# Patient Record
Sex: MALE | Race: WHITE | NOT HISPANIC OR LATINO | Employment: OTHER | URBAN - METROPOLITAN AREA
[De-identification: names, ages, dates, MRNs, and addresses within clinical notes are randomized per-mention and may not be internally consistent; named-entity substitution may affect disease eponyms.]

---

## 2017-02-03 ENCOUNTER — LAB VISIT (OUTPATIENT)
Dept: LAB | Facility: HOSPITAL | Age: 52
End: 2017-02-03
Attending: INTERNAL MEDICINE
Payer: COMMERCIAL

## 2017-02-03 DIAGNOSIS — E78.5 HYPERLIPIDEMIA, UNSPECIFIED HYPERLIPIDEMIA TYPE: ICD-10-CM

## 2017-02-03 LAB
CHOLEST/HDLC SERPL: 4.8 {RATIO}
HDL/CHOLESTEROL RATIO: 20.7 %
HDLC SERPL-MCNC: 193 MG/DL
HDLC SERPL-MCNC: 40 MG/DL
LDLC SERPL CALC-MCNC: 123.2 MG/DL
NONHDLC SERPL-MCNC: 153 MG/DL
TRIGL SERPL-MCNC: 149 MG/DL

## 2017-02-03 PROCEDURE — 80061 LIPID PANEL: CPT

## 2017-02-03 PROCEDURE — 36415 COLL VENOUS BLD VENIPUNCTURE: CPT | Mod: PO

## 2017-02-09 ENCOUNTER — OFFICE VISIT (OUTPATIENT)
Dept: INTERNAL MEDICINE | Facility: CLINIC | Age: 52
End: 2017-02-09
Payer: COMMERCIAL

## 2017-02-09 VITALS
HEIGHT: 72 IN | BODY MASS INDEX: 25.89 KG/M2 | SYSTOLIC BLOOD PRESSURE: 124 MMHG | HEART RATE: 86 BPM | OXYGEN SATURATION: 97 % | DIASTOLIC BLOOD PRESSURE: 80 MMHG | TEMPERATURE: 98 F | WEIGHT: 191.13 LBS

## 2017-02-09 DIAGNOSIS — E78.5 HYPERLIPIDEMIA, UNSPECIFIED HYPERLIPIDEMIA TYPE: Primary | ICD-10-CM

## 2017-02-09 PROCEDURE — 99999 PR PBB SHADOW E&M-EST. PATIENT-LVL III: CPT | Mod: PBBFAC,,, | Performed by: INTERNAL MEDICINE

## 2017-02-09 PROCEDURE — 99214 OFFICE O/P EST MOD 30 MIN: CPT | Mod: S$GLB,,, | Performed by: INTERNAL MEDICINE

## 2017-02-09 NOTE — PATIENT INSTRUCTIONS
Recommendations for today  · Suspend mirtazapine therapy and see how you do.  · It is possible for the first 2 or 3 nights to have a little more difficulty with sleep.  · If problems with sleep persist beyond 3 days and they are disruptive you can resume the medication.  Please communicate the outcome of this medication holiday.    Watching blood pressure    Avoid common mistakes when checking blood pressure  Taking blood pressure over the top of clothing.  Checking blood pressure repeatedly on the same arm (better to check on the other arm)  Using blood pressure cuff that is too small (inflatable portion should cover at least 75% of your upper arm)    GOAL BLOOD PRESSURE:     Top number less than 140  Bottom number less than 90    Please contact the clinic if you have several numbers above your target

## 2017-02-09 NOTE — MR AVS SNAPSHOT
[facility]   [place]  [place] [address]  Phone: [phone]  Fax: [phone]                  Simeon Bishop   2017 2:00 PM   Office Visit    Description:  Male : 1965   Provider:  Carlo Menchaca MD   Department:  [facility]           Reason for Visit     Follow-up           Diagnoses this Visit        Comments    Hyperlipidemia, unspecified hyperlipidemia type    -  Primary            To Do List           Future Appointments        Provider Department Dept Phone    8/3/2017 7:45 AM LAB, [facility] [phone]    2017 10:20 AM Carlo Menchaca MD [facility] [phone]      Goals (5 Years of Data)     None      [facility]     [facility] Care [facility] -  Assistance  Registered nurses in the [facility] provide clinical advisement, health education, appointment booking, and other advisory services.  Call for this free service at [phone].             Medications           Message regarding Medications     Verify the changes and/or additions to your medication regime listed below are the same as discussed with your clinician today.  If any of these changes or additions are incorrect, please notify your healthcare provider.        STOP taking these medications     mirtazapine (REMERON) 15 MG tablet Take 1 tablet (15 mg total) by mouth every evening.           Verify that the below list of medications is an accurate representation of the medications you are currently taking.  If none reported, the list may be blank. If incorrect, please contact your healthcare provider. Carry this list with you in case of emergency.           Current Medications            Clinical Reference Information           Your Vitals Were     BP Pulse Temp Height Weight SpO2    124/80 (BP Location: Right arm, Patient Position: Sitting, BP Method: Manual) 86 97.7 °F (36.5 °C)  (Oral) 6' (1.829 m) 86.7 kg (191 lb 2.2 oz) 97%    BMI                25.92 kg/m2          Blood Pressure          Most Recent Value    BP  124/80      Allergies as of 2/9/2017     No Known Allergies      Immunizations Administered on Date of Encounter - 2/9/2017     None      Orders Placed During Today's Visit     Future Labs/Procedures Expected by Expires    Lipid panel  2/9/2017 9/10/2017      Instructions    Recommendations for today  · Suspend mirtazapine therapy and see how you do.  · It is possible for the first 2 or 3 nights to have a little more difficulty with sleep.  · If problems with sleep persist beyond 3 days and they are disruptive you can resume the medication.  Please communicate the outcome of this medication holiday.    Watching blood pressure    Avoid common mistakes when checking blood pressure  Taking blood pressure over the top of clothing.  Checking blood pressure repeatedly on the same arm (better to check on the other arm)  Using blood pressure cuff that is too small (inflatable portion should cover at least 75% of your upper arm)    GOAL BLOOD PRESSURE:     Top number less than 140  Bottom number less than 90    Please contact the clinic if you have several numbers above your target             Language Assistance Services     ATTENTION: Language assistance services are available, free of charge. Please call [phone].      ATENCIÓN: Si habla ryan, tiene a price disposición servicios gratuitos de asistencia lingüística. Llame al [phone].     CHÚ Ý: N?u b?n nói Ti?ng Vi?t, có các d?ch v? h? tr? ngôn ng? mi?n phí dành cho b?n. G?i s? [phone].         [facility] complies with applicable Federal civil rights laws and does not discriminate on the basis of race, color, national origin, age, disability, or sex.

## 2017-02-09 NOTE — PROGRESS NOTES
Portions of this note are generated with voice recognition software. Typographical errors may exist.     SUBJECTIVE:    This is a/an 51 y.o. male here for primary care visit for  Chief Complaint   Patient presents with    Follow-up     hyperlipidemia     Patient states that he has been very physically active since his last evaluation in clinic.  At least 5 days per week he is going to [employer] for approximately 30-40 minutes before going to work.  [place]  Patient denies any specific orthopedic injuries that have occurred as a result of this new routine.  States that overall his stress levels have reduced significantly.    Insomnia.  Since starting mirtazapine the patient states that his sleeping is more restful.  States that he typically gets home from work at 5:30 PM.  Initiate sleep around 10:30 PM.  Sleep latency is usually not prolonged.  He will go to sleep at 11 PM typically and awaken at 7 AM without many interruptions.  States that he feels rested in the morning.  No excessive grogginess.    Anxiety.  Patient states that he does not have any disruptive symptoms at work.  Interpersonal relationships have improved.  Patient has complied with mirtazapine since starting medication.  On the other hand the patient states he doesn't want to stay on the medication long-term if possible.  He denies family history of hyperthyroidism.    Hyperlipidemia.  Patient states that he did make some changes in food choices.  Reducing the amount of red meat that he eats.  Reducing consumption of alcoholic beverages.    Medications Reviewed and Updated    Past medical, family, and social histories were reviewed and updated.    Review of Systems negative unless noted otherwise in history of present illness-  General ROS: negative  Psychological ROS: negative  Endocrine ROS: negative  Musculoskeletal ROS: negative  Neurological ROS: negative    Allergic:  Review of patient's allergies  indicates:  No Known Allergies    OBJECTIVE:  BP: 124/80 Pulse: 86 Temp: 97.7 °F (36.5 °C)  Wt Readings from Last 3 Encounters:   02/09/17 86.7 kg (191 lb 2.2 oz)   11/15/16 87.5 kg (193 lb)   11/09/16 88.5 kg (195 lb 1.7 oz)    Body mass index is 25.92 kg/(m^2).  Previous Blood Pressure Readings :   BP Readings from Last 3 Encounters:   02/09/17 124/80   11/15/16 119/76   11/09/16 (!) 140/92       GEN: No apparent distress  HEENT: sclera non-icteric, conjunctiva clear  CV: no peripheral edema regular rate and rhythm no significant murmurs.  PULM: breathing non-labored  ABD: Obese, protuberant abdomen.  PSYCH: appropriate affect  MSK: able to rise from chair without assistance  SKIN: normal skin turgor    Pertinent Labs Reviewed       ASSESSMENT/PLAN:    Overweight.  Clinical condition improving.  Recommend patient continue with lifestyle measures.    Insomnia.  Clinical follow-up warranted.  Etiology multifactorial.  Sleep hygiene problems.  Anxiety disorder unspecified.  Recommend patient pursue medication holiday as below.    Anxiety disorder unspecified.  Improving overall.  No strong indication for patient to remain on mirtazapine therapy at this time.  Recommend medication holiday to see how he does.    Hyperlipidemia.  Improved overall with lifestyle measures.  No clear indication for statin therapy at this time.      Future Appointments  Date Time Provider Department Center   8/3/2017 7:45 AM LAB, [facility] [place]   8/9/2017 10:20 AM Carlo Menchaca MD [facility] [place]       Carlo Menchaca  2/9/2017  3:18 PM

## 2017-10-23 DIAGNOSIS — F51.05 INSOMNIA SECONDARY TO ANXIETY: ICD-10-CM

## 2017-10-23 DIAGNOSIS — F41.9 INSOMNIA SECONDARY TO ANXIETY: ICD-10-CM

## 2017-10-24 RX ORDER — MIRTAZAPINE 15 MG/1
TABLET, FILM COATED ORAL
Qty: 90 TABLET | Refills: 1 | Status: SHIPPED | OUTPATIENT
Start: 2017-10-24 | End: 2021-01-13 | Stop reason: SDUPTHER

## 2020-03-28 ENCOUNTER — OFFICE VISIT (OUTPATIENT)
Dept: URGENT CARE | Facility: CLINIC | Age: 55
End: 2020-03-28
Payer: COMMERCIAL

## 2020-03-28 VITALS
HEIGHT: 72 IN | WEIGHT: 191.13 LBS | RESPIRATION RATE: 18 BRPM | TEMPERATURE: 99 F | DIASTOLIC BLOOD PRESSURE: 89 MMHG | BODY MASS INDEX: 25.89 KG/M2 | HEART RATE: 87 BPM | OXYGEN SATURATION: 96 % | SYSTOLIC BLOOD PRESSURE: 128 MMHG

## 2020-03-28 DIAGNOSIS — M79.605 LEFT LEG PAIN: ICD-10-CM

## 2020-03-28 DIAGNOSIS — R07.89 CHEST WALL PAIN: Primary | ICD-10-CM

## 2020-03-28 PROCEDURE — 93010 EKG 12-LEAD: ICD-10-PCS | Mod: S$GLB,,, | Performed by: INTERNAL MEDICINE

## 2020-03-28 PROCEDURE — 99203 OFFICE O/P NEW LOW 30 MIN: CPT | Mod: S$GLB,,, | Performed by: FAMILY MEDICINE

## 2020-03-28 PROCEDURE — 99203 PR OFFICE/OUTPT VISIT, NEW, LEVL III, 30-44 MIN: ICD-10-PCS | Mod: S$GLB,,, | Performed by: FAMILY MEDICINE

## 2020-03-28 PROCEDURE — 93010 ELECTROCARDIOGRAM REPORT: CPT | Mod: S$GLB,,, | Performed by: INTERNAL MEDICINE

## 2020-03-28 PROCEDURE — 93005 ELECTROCARDIOGRAM TRACING: CPT | Mod: S$GLB,,, | Performed by: FAMILY MEDICINE

## 2020-03-28 PROCEDURE — 93005 EKG 12-LEAD: ICD-10-PCS | Mod: S$GLB,,, | Performed by: FAMILY MEDICINE

## 2020-03-28 RX ORDER — CYCLOBENZAPRINE HCL 5 MG
5 TABLET ORAL 3 TIMES DAILY PRN
Qty: 30 TABLET | Refills: 0 | Status: SHIPPED | OUTPATIENT
Start: 2020-03-28 | End: 2021-01-13 | Stop reason: SDUPTHER

## 2020-03-28 NOTE — PROGRESS NOTES
Subjective:       Patient ID: Simeon Bishop is a 54 y.o. male.    Vitals:  height is 6' (1.829 m) and weight is 86.7 kg (191 lb 2.2 oz). His tympanic temperature is 98.5 °F (36.9 °C). His blood pressure is 128/89 and his pulse is 87. His respiration is 18 and oxygen saturation is 96%.     Chief Complaint: Leg Pain (no injury )    Patient states 1 month intermittent left lateral thigh pain, he feels more constant in the last week feels like a throbbing will go away with stretching but then returns.  Also with 2 weeks of right chest wall pain that was intermittent now he feels more constant.  No shortness of breath nausea diaphoresis.  No chest tightness or pressure.  He can specifically localize the chest wall pain and the leg pain.  The chest wall pain feels like a dull sometimes sharp pain.  He is worried about a blood clot and a pulmonary embolism from what he read on the Internet.  States his anxiety has been heightened in the last few weeks with corona virus and his brother is in [facility].  Patient states he did not take any medicine.  No recent travel immobilization hospitalizations.  Never had leg pain or swelling.  No history of DVT or PE.    Leg Pain    The incident occurred more than 1 week ago. The incident occurred at home. There was no injury mechanism. The pain is present in the left leg. The quality of the pain is described as stabbing. The pain has been fluctuating since onset. Pertinent negatives include no inability to bear weight, loss of motion, loss of sensation, muscle weakness, numbness or tingling.       Constitution: Negative for chills, fatigue and fever.   HENT: Negative for congestion and sore throat.    Neck: Negative for painful lymph nodes.   Cardiovascular: Positive for chest pain (right chest wall). Negative for leg swelling, palpitations and sob on exertion.   Eyes: Negative for double vision and blurred vision.   Respiratory: Negative for cough and shortness of breath.     Gastrointestinal: Negative for nausea, vomiting and diarrhea.   Genitourinary: Negative for dysuria, frequency and urgency.   Musculoskeletal: Positive for pain (left leg). Negative for joint pain, joint swelling, muscle cramps and muscle ache.   Skin: Negative for color change, pale, rash and erythema.   Allergic/Immunologic: Negative for seasonal allergies.   Neurological: Negative for dizziness, history of vertigo, light-headedness, passing out, headaches and numbness.   Hematologic/Lymphatic: Negative for swollen lymph nodes, easy bruising/bleeding and history of blood clots. Does not bruise/bleed easily.   Psychiatric/Behavioral: Negative for nervous/anxious, sleep disturbance and depression. The patient is not nervous/anxious.        Objective:      Physical Exam   Constitutional: He is oriented to person, place, and time. He appears well-developed and well-nourished.   Pt is fidgeting a lot in clinic, appears a little anxious   HENT:   Head: Normocephalic and atraumatic. Head is without abrasion, without contusion and without laceration.   Right Ear: External ear normal.   Left Ear: External ear normal.   Nose: Nose normal.   Mouth/Throat: Oropharynx is clear and moist and mucous membranes are normal.   Eyes: Pupils are equal, round, and reactive to light. Conjunctivae, EOM and lids are normal.   Neck: Trachea normal, full passive range of motion without pain and phonation normal. Neck supple.   Cardiovascular: Normal rate, regular rhythm and normal heart sounds.   No murmur heard.      Pulmonary/Chest: Effort normal and breath sounds normal. No accessory muscle usage or stridor. No tachypnea. No respiratory distress. He has no decreased breath sounds. He has no wheezes. He has no rhonchi. He has no rales.   Musculoskeletal: Normal range of motion.        Legs:  Thighs and calves measured and symmetrical.  No erythema warmth swelling.  Neurovascularly intact distally.  Walking without difficulty    Neurological: He is alert and oriented to person, place, and time.   Skin: Skin is warm, dry, intact and no rash. Capillary refill takes less than 2 seconds. abrasion, burn, bruising, erythema and ecchymosis  Psychiatric: He has a normal mood and affect. His speech is normal and behavior is normal. Judgment and thought content normal. Cognition and memory are normal.   Nursing note and vitals reviewed.      EKG: normal EKG, normal sinus rhythm, unchanged from previous tracings. Rate of 75 bpm. No ectopy. T wave inversion III present on previous. Upward slant of ST segment V3-V6 present on previous ekg.     Assessment:       1. Chest wall pain    2. Left leg pain        Plan:         Chest wall pain  -     EKG 12-lead  -     cyclobenzaprine (FLEXERIL) 5 MG tablet; Take 1 tablet (5 mg total) by mouth 3 (three) times daily as needed for Muscle spasms (1-2 tablets prn).  Dispense: 30 tablet; Refill: 0    Left leg pain  -     cyclobenzaprine (FLEXERIL) 5 MG tablet; Take 1 tablet (5 mg total) by mouth 3 (three) times daily as needed for Muscle spasms (1-2 tablets prn).  Dispense: 30 tablet; Refill: 0     Based on patient's description of the pain I feel this is more likely musculoskeletal.  He has no asymmetry upon measuring the legs no redness warmth swelling, no recent immobilizations or hospitals and no personal history thrombosis.  Wells score 0. I discussed warning signs to go the ER patient agreeable    Patient Instructions   PLEASE READ YOUR DISCHARGE INSTRUCTIONS ENTIRELY AS IT CONTAINS IMPORTANT INFORMATION.      Please drink plenty of fluids.  Please get plenty of rest.  Ice to the area.   You may do gently stretching if tolerable.    Please return here or go to the Emergency Department for any concerns or worsening of condition.    If you were prescribed a narcotic medication or muscle relaxer (flexeril), do not drive or operate heavy equipment or machinery while taking these medications. Take a half first to  see how it affects you  Tried taking once daily to start    Tried taking once daily for the 1st few days  If you were not prescribed an anti-inflammatory medication, and if you do not have any history of stomach/intestinal ulcers, or kidney disease, or are not taking a blood thinner such as Coumadin, Plavix, Pradaxa, Eloquis, or Xaralta for example, it is OK to take over the counter Ibuprofen or Advil or Motrin or Aleve as directed.  Do not take these medications on an empty stomach.      Gentle stretching    Ice to the area    Please go to the emergency room if you experience different or changing chest pain, shortness of breath, funny heart beats, headache, blurred vision, weakness in one arm or leg, slurred speech, numbness, inability to walk or talk, confusion.       Please follow up with your primary care doctor or specialist as needed.    If you  smoke, please stop smoking.      Please arrange follow up with your primary medical clinic as soon as possible. You must understand that you've received an Urgent Care treatment only and that you may be released before all of your medical problems are known or treated. You, the patient, will arrange for follow up as instructed. If your symptoms worsen or fail to improve you should go to the Emergency Room.      Chest Strain    You have a chest strain. This happens when the muscles between the ribs stretch and tear. This may occur when you have a severe cough. It may also happen after strenuous lifting or twisting injuries of the upper back.  A chest strain usually causes pain when you move or take a deep breath. The strain may take a few days to a few weeks to heal.  Home care  Follow these guidelines when caring for yourself at home:  · Rest. Dont do any heavy lifting or strenuous activity. Dont do any activity that causes pain.  · If you have a severe cough, use a cough syrup with dextromethorphan, unless another cough medicine was prescribed. If you have high  blood pressure, check with your health care provider or pharmacist before using an over-the-counter cough medicine.  · You may use acetaminophen or ibuprofen to control pain, unless another medicine was prescribed. If you have chronic liver or kidney disease, talk with your provider before using these medicines. Also talk with your provider if youve had a stomach ulcer or GI bleeding.  Follow-up care  Follow up with your health care provider, or as advised.  When to seek medical advice  Call your health care provider right away if any of these occur:  · A change in the type of pain. This means if it feels different, gets worse, lasts longer, or begins to spread into your shoulder, arm, neck, jaw, or back.  · Pain doesnt go away in 1 week  · Shortness of breath, difficulty breathing, or fast breathing  · Pain gets worse when you breathe  · Cough with dark-colored sputum (phlegm) or blood  · Weakness, dizziness, or fainting  · Fever of 101ºF (38.3ºC) or higher, or as directed by your health care provider   Date Last Reviewed: 2/15/2015  © 3405-4572 [employer]. [address] [place] [address]. All rights reserved. This information is not intended as a substitute for professional medical care. Always follow your healthcare professional's instructions.        Leg Cramps  A muscle cramp or spasm is a strong contraction of the muscle fibers. It is also called a charley horse. This may occur in the foot, calf, or thigh at night when the legs are elevated. If the spasm is prolonged, it can become very painful.  This may be caused by sleeping in an uncomfortable position, muscle fatigue, poor muscle tone from lack of exercise and stretching, dehydration, electrolyte imbalance, diabetes, alcohol use, and certain medicine.  Home care  · Drink plenty of fluids during the day to prevent dehydration.  · Stretch your legs before bedtime.  · Eat a diet high in potassium. These foods include fresh fruit,  such as bananas, oranges, cantaloupe, and honeydew melon. It also includes apple, prune, orange, grape and pineapple juices. Other foods high in potassium are white, red, and moreno beans, baked potatoes, raw spinach, cod, flounder, halibut, salmon, and scallops.  · Talk with your healthcare provider about taking mineral and vitamin supplements that contain magnesium and vitamin B-12 if you are not already taking these. Other prescription medicines may also be used.  · Avoid stimulants such as caffeine, nicotine, decongestants.  How to relieve an acute leg cramp  · For mild pain, getting out of the bed and walking may help. Some people find relief with heat and massage. You can apply heat with a warm shower, bath, or compress. Some people feel better with a cold packs. You can make an ice pack by filling a plastic bag that seals at the top with ice cubes and then wrapping it with a thin towel. Try both and use the method that feels best for 15 to 20 minutes at a time.  · For severe pain, stretching the muscle that is in spasm may quickly relieve the pain.  · When the spasm is in your foot, your toes may curl up or down. To stretch the muscle in spasm, bend your toes in the opposite direction. If the spasm pulls your toes up, bend them down. If the spasm pulls them down, bend them up.  · When the spasm is in your calf, bend the ankle so the foot points upward toward your knee.  · When the spasm is in your thigh, bend or straighten the knee and hip until you feel relief.  Follow-up care  Follow up with your healthcare provider, or as advised.  When to seek medical advice  Call your healthcare provider right away if any of these occur:  · Walking makes your pain worse and rest makes it better  · You develop weakness in the affected leg  · Pain or frequency of spasms increases and is not controlled by the above measures  Date Last Reviewed: 11/23/2015  © 4971-6464 [employer]. [address] [place] [address],  JOHNNY Felix [address]. All rights reserved. This information is not intended as a substitute for professional medical care. Always follow your healthcare professional's instructions.

## 2020-03-28 NOTE — PATIENT INSTRUCTIONS
PLEASE READ YOUR DISCHARGE INSTRUCTIONS ENTIRELY AS IT CONTAINS IMPORTANT INFORMATION.      Please drink plenty of fluids.  Please get plenty of rest.  Ice to the area.   You may do gently stretching if tolerable.    Please return here or go to the Emergency Department for any concerns or worsening of condition.    If you were prescribed a narcotic medication or muscle relaxer (flexeril), do not drive or operate heavy equipment or machinery while taking these medications. Take a half first to see how it affects you  Tried taking once daily to start    Tried taking once daily for the 1st few days  If you were not prescribed an anti-inflammatory medication, and if you do not have any history of stomach/intestinal ulcers, or kidney disease, or are not taking a blood thinner such as Coumadin, Plavix, Pradaxa, Eloquis, or Xaralta for example, it is OK to take over the counter Ibuprofen or Advil or Motrin or Aleve as directed.  Do not take these medications on an empty stomach.      Gentle stretching    Ice to the area    Please go to the emergency room if you experience different or changing chest pain, shortness of breath, funny heart beats, headache, blurred vision, weakness in one arm or leg, slurred speech, numbness, inability to walk or talk, confusion.       Please follow up with your primary care doctor or specialist as needed.    If you  smoke, please stop smoking.      Please arrange follow up with your primary medical clinic as soon as possible. You must understand that you've received an Urgent Care treatment only and that you may be released before all of your medical problems are known or treated. You, the patient, will arrange for follow up as instructed. If your symptoms worsen or fail to improve you should go to the Emergency Room.      Chest Strain    You have a chest strain. This happens when the muscles between the ribs stretch and tear. This may occur when you have a severe cough. It may also happen  after strenuous lifting or twisting injuries of the upper back.  A chest strain usually causes pain when you move or take a deep breath. The strain may take a few days to a few weeks to heal.  Home care  Follow these guidelines when caring for yourself at home:  · Rest. Dont do any heavy lifting or strenuous activity. Dont do any activity that causes pain.  · If you have a severe cough, use a cough syrup with dextromethorphan, unless another cough medicine was prescribed. If you have high blood pressure, check with your health care provider or pharmacist before using an over-the-counter cough medicine.  · You may use acetaminophen or ibuprofen to control pain, unless another medicine was prescribed. If you have chronic liver or kidney disease, talk with your provider before using these medicines. Also talk with your provider if youve had a stomach ulcer or GI bleeding.  Follow-up care  Follow up with your health care provider, or as advised.  When to seek medical advice  Call your health care provider right away if any of these occur:  · A change in the type of pain. This means if it feels different, gets worse, lasts longer, or begins to spread into your shoulder, arm, neck, jaw, or back.  · Pain doesnt go away in 1 week  · Shortness of breath, difficulty breathing, or fast breathing  · Pain gets worse when you breathe  · Cough with dark-colored sputum (phlegm) or blood  · Weakness, dizziness, or fainting  · Fever of 101ºF (38.3ºC) or higher, or as directed by your health care provider   Date Last Reviewed: 2/15/2015  © 0647-1301 [employer]. [address] [place] [address], [place] [address]. All rights reserved. This information is not intended as a substitute for professional medical care. Always follow your healthcare professional's instructions.        Leg Cramps  A muscle cramp or spasm is a strong contraction of the muscle fibers. It is also called a charley horse. This may occur in the foot, calf,  or thigh at night when the legs are elevated. If the spasm is prolonged, it can become very painful.  This may be caused by sleeping in an uncomfortable position, muscle fatigue, poor muscle tone from lack of exercise and stretching, dehydration, electrolyte imbalance, diabetes, alcohol use, and certain medicine.  Home care  · Drink plenty of fluids during the day to prevent dehydration.  · Stretch your legs before bedtime.  · Eat a diet high in potassium. These foods include fresh fruit, such as bananas, oranges, cantaloupe, and honeydew melon. It also includes apple, prune, orange, grape and pineapple juices. Other foods high in potassium are white, red, and moreno beans, baked potatoes, raw spinach, cod, flounder, halibut, salmon, and scallops.  · Talk with your healthcare provider about taking mineral and vitamin supplements that contain magnesium and vitamin B-12 if you are not already taking these. Other prescription medicines may also be used.  · Avoid stimulants such as caffeine, nicotine, decongestants.  How to relieve an acute leg cramp  · For mild pain, getting out of the bed and walking may help. Some people find relief with heat and massage. You can apply heat with a warm shower, bath, or compress. Some people feel better with a cold packs. You can make an ice pack by filling a plastic bag that seals at the top with ice cubes and then wrapping it with a thin towel. Try both and use the method that feels best for 15 to 20 minutes at a time.  · For severe pain, stretching the muscle that is in spasm may quickly relieve the pain.  · When the spasm is in your foot, your toes may curl up or down. To stretch the muscle in spasm, bend your toes in the opposite direction. If the spasm pulls your toes up, bend them down. If the spasm pulls them down, bend them up.  · When the spasm is in your calf, bend the ankle so the foot points upward toward your knee.  · When the spasm is in your thigh, bend or straighten  the knee and hip until you feel relief.  Follow-up care  Follow up with your healthcare provider, or as advised.  When to seek medical advice  Call your healthcare provider right away if any of these occur:  · Walking makes your pain worse and rest makes it better  · You develop weakness in the affected leg  · Pain or frequency of spasms increases and is not controlled by the above measures  Date Last Reviewed: 11/23/2015  © 7683-8424 "[employer]. [address] [place] [address], [place] [address]. All rights reserved. This information is not intended as a substitute for professional medical care. Always follow your healthcare professional's instructions.

## 2021-01-13 ENCOUNTER — OFFICE VISIT (OUTPATIENT)
Dept: FAMILY MEDICINE | Facility: CLINIC | Age: 56
End: 2021-01-13
Payer: COMMERCIAL

## 2021-01-13 VITALS
SYSTOLIC BLOOD PRESSURE: 136 MMHG | TEMPERATURE: 97 F | HEIGHT: 72 IN | HEART RATE: 89 BPM | RESPIRATION RATE: 16 BRPM | WEIGHT: 199.5 LBS | OXYGEN SATURATION: 98 % | BODY MASS INDEX: 27.02 KG/M2 | DIASTOLIC BLOOD PRESSURE: 98 MMHG

## 2021-01-13 DIAGNOSIS — Z00.00 WELL ADULT EXAM: Primary | ICD-10-CM

## 2021-01-13 DIAGNOSIS — F41.9 ANXIETY: ICD-10-CM

## 2021-01-13 DIAGNOSIS — Z12.11 COLON CANCER SCREENING: ICD-10-CM

## 2021-01-13 DIAGNOSIS — S46.819A STRAIN OF TRAPEZIUS MUSCLE, UNSPECIFIED LATERALITY, INITIAL ENCOUNTER: ICD-10-CM

## 2021-01-13 DIAGNOSIS — R03.0 ELEVATED BLOOD PRESSURE READING: ICD-10-CM

## 2021-01-13 PROCEDURE — 1126F AMNT PAIN NOTED NONE PRSNT: CPT | Mod: S$GLB,,, | Performed by: STUDENT IN AN ORGANIZED HEALTH CARE EDUCATION/TRAINING PROGRAM

## 2021-01-13 PROCEDURE — 3008F PR BODY MASS INDEX (BMI) DOCUMENTED: ICD-10-PCS | Mod: CPTII,S$GLB,, | Performed by: STUDENT IN AN ORGANIZED HEALTH CARE EDUCATION/TRAINING PROGRAM

## 2021-01-13 PROCEDURE — 99214 PR OFFICE/OUTPT VISIT, EST, LEVL IV, 30-39 MIN: ICD-10-PCS | Mod: S$GLB,,, | Performed by: STUDENT IN AN ORGANIZED HEALTH CARE EDUCATION/TRAINING PROGRAM

## 2021-01-13 PROCEDURE — 3008F BODY MASS INDEX DOCD: CPT | Mod: CPTII,S$GLB,, | Performed by: STUDENT IN AN ORGANIZED HEALTH CARE EDUCATION/TRAINING PROGRAM

## 2021-01-13 PROCEDURE — 99214 OFFICE O/P EST MOD 30 MIN: CPT | Mod: S$GLB,,, | Performed by: STUDENT IN AN ORGANIZED HEALTH CARE EDUCATION/TRAINING PROGRAM

## 2021-01-13 PROCEDURE — 99999 PR PBB SHADOW E&M-EST. PATIENT-LVL III: ICD-10-PCS | Mod: PBBFAC,,, | Performed by: STUDENT IN AN ORGANIZED HEALTH CARE EDUCATION/TRAINING PROGRAM

## 2021-01-13 PROCEDURE — 99999 PR PBB SHADOW E&M-EST. PATIENT-LVL III: CPT | Mod: PBBFAC,,, | Performed by: STUDENT IN AN ORGANIZED HEALTH CARE EDUCATION/TRAINING PROGRAM

## 2021-01-13 PROCEDURE — 1126F PR PAIN SEVERITY QUANTIFIED, NO PAIN PRESENT: ICD-10-PCS | Mod: S$GLB,,, | Performed by: STUDENT IN AN ORGANIZED HEALTH CARE EDUCATION/TRAINING PROGRAM

## 2021-01-13 RX ORDER — MIRTAZAPINE 15 MG/1
15 TABLET, FILM COATED ORAL NIGHTLY
Qty: 90 TABLET | Refills: 0 | Status: SHIPPED | OUTPATIENT
Start: 2021-01-13 | End: 2022-08-12 | Stop reason: SDUPTHER

## 2021-01-13 RX ORDER — CYCLOBENZAPRINE HCL 5 MG
5 TABLET ORAL 3 TIMES DAILY PRN
Qty: 30 TABLET | Refills: 0 | Status: SHIPPED | OUTPATIENT
Start: 2021-01-13 | End: 2022-08-12 | Stop reason: SDUPTHER

## 2021-01-20 ENCOUNTER — LAB VISIT (OUTPATIENT)
Dept: LAB | Facility: HOSPITAL | Age: 56
End: 2021-01-20
Attending: STUDENT IN AN ORGANIZED HEALTH CARE EDUCATION/TRAINING PROGRAM
Payer: COMMERCIAL

## 2021-01-20 DIAGNOSIS — Z00.00 WELL ADULT EXAM: ICD-10-CM

## 2021-01-20 LAB
ALBUMIN SERPL BCP-MCNC: 4.4 G/DL (ref 3.5–5.2)
ALP SERPL-CCNC: 67 U/L (ref 55–135)
ALT SERPL W/O P-5'-P-CCNC: 30 U/L (ref 10–44)
ANION GAP SERPL CALC-SCNC: 8 MMOL/L (ref 8–16)
AST SERPL-CCNC: 17 U/L (ref 10–40)
BASOPHILS # BLD AUTO: 0.06 K/UL (ref 0–0.2)
BASOPHILS NFR BLD: 1 % (ref 0–1.9)
BILIRUB SERPL-MCNC: 0.6 MG/DL (ref 0.1–1)
BUN SERPL-MCNC: 18 MG/DL (ref 6–20)
CALCIUM SERPL-MCNC: 9.4 MG/DL (ref 8.7–10.5)
CHLORIDE SERPL-SCNC: 106 MMOL/L (ref 95–110)
CHOLEST SERPL-MCNC: 241 MG/DL (ref 120–199)
CHOLEST/HDLC SERPL: 5.7 {RATIO} (ref 2–5)
CO2 SERPL-SCNC: 27 MMOL/L (ref 23–29)
CREAT SERPL-MCNC: 1.3 MG/DL (ref 0.5–1.4)
DIFFERENTIAL METHOD: NORMAL
EOSINOPHIL # BLD AUTO: 0.3 K/UL (ref 0–0.5)
EOSINOPHIL NFR BLD: 4.5 % (ref 0–8)
ERYTHROCYTE [DISTWIDTH] IN BLOOD BY AUTOMATED COUNT: 11.9 % (ref 11.5–14.5)
EST. GFR  (AFRICAN AMERICAN): >60 ML/MIN/1.73 M^2
EST. GFR  (NON AFRICAN AMERICAN): >60 ML/MIN/1.73 M^2
GLUCOSE SERPL-MCNC: 113 MG/DL (ref 70–110)
HCT VFR BLD AUTO: 47.1 % (ref 40–54)
HDLC SERPL-MCNC: 42 MG/DL (ref 40–75)
HDLC SERPL: 17.4 % (ref 20–50)
HGB BLD-MCNC: 15.1 G/DL (ref 14–18)
IMM GRANULOCYTES # BLD AUTO: 0.01 K/UL (ref 0–0.04)
IMM GRANULOCYTES NFR BLD AUTO: 0.2 % (ref 0–0.5)
LDLC SERPL CALC-MCNC: 130.6 MG/DL (ref 63–159)
LYMPHOCYTES # BLD AUTO: 1.8 K/UL (ref 1–4.8)
LYMPHOCYTES NFR BLD: 30.5 % (ref 18–48)
MCH RBC QN AUTO: 29.7 PG (ref 27–31)
MCHC RBC AUTO-ENTMCNC: 32.1 G/DL (ref 32–36)
MCV RBC AUTO: 93 FL (ref 82–98)
MONOCYTES # BLD AUTO: 0.5 K/UL (ref 0.3–1)
MONOCYTES NFR BLD: 8.9 % (ref 4–15)
NEUTROPHILS # BLD AUTO: 3.1 K/UL (ref 1.8–7.7)
NEUTROPHILS NFR BLD: 54.9 % (ref 38–73)
NONHDLC SERPL-MCNC: 199 MG/DL
NRBC BLD-RTO: 0 /100 WBC
PLATELET # BLD AUTO: 267 K/UL (ref 150–350)
PMV BLD AUTO: 9.5 FL (ref 9.2–12.9)
POTASSIUM SERPL-SCNC: 4.3 MMOL/L (ref 3.5–5.1)
PROT SERPL-MCNC: 7.3 G/DL (ref 6–8.4)
RBC # BLD AUTO: 5.08 M/UL (ref 4.6–6.2)
SODIUM SERPL-SCNC: 141 MMOL/L (ref 136–145)
TRIGL SERPL-MCNC: 342 MG/DL (ref 30–150)
TSH SERPL DL<=0.005 MIU/L-ACNC: 1.47 UIU/ML (ref 0.4–4)
WBC # BLD AUTO: 5.73 K/UL (ref 3.9–12.7)

## 2021-01-20 PROCEDURE — 84443 ASSAY THYROID STIM HORMONE: CPT

## 2021-01-20 PROCEDURE — 36415 COLL VENOUS BLD VENIPUNCTURE: CPT | Mod: PO

## 2021-01-20 PROCEDURE — 83036 HEMOGLOBIN GLYCOSYLATED A1C: CPT

## 2021-01-20 PROCEDURE — 80061 LIPID PANEL: CPT

## 2021-01-20 PROCEDURE — 80053 COMPREHEN METABOLIC PANEL: CPT

## 2021-01-20 PROCEDURE — 85025 COMPLETE CBC W/AUTO DIFF WBC: CPT

## 2021-01-21 LAB
ESTIMATED AVG GLUCOSE: 108 MG/DL (ref 68–131)
HBA1C MFR BLD HPLC: 5.4 % (ref 4–5.6)

## 2022-01-19 ENCOUNTER — LAB VISIT (OUTPATIENT)
Dept: PRIMARY CARE CLINIC | Facility: CLINIC | Age: 57
End: 2022-01-19
Payer: COMMERCIAL

## 2022-01-19 DIAGNOSIS — Z20.822 CONTACT WITH AND (SUSPECTED) EXPOSURE TO COVID-19: ICD-10-CM

## 2022-01-19 LAB
CTP QC/QA: YES
SARS-COV-2 AG RESP QL IA.RAPID: POSITIVE

## 2022-01-19 PROCEDURE — 87811 SARS-COV-2 COVID19 W/OPTIC: CPT

## 2022-03-13 ENCOUNTER — HOSPITAL ENCOUNTER (EMERGENCY)
Facility: HOSPITAL | Age: 57
Discharge: HOME | End: 2022-03-13
Attending: EMERGENCY MEDICINE
Payer: COMMERCIAL

## 2022-03-13 VITALS
DIASTOLIC BLOOD PRESSURE: 102 MMHG | TEMPERATURE: 99 F | SYSTOLIC BLOOD PRESSURE: 133 MMHG | BODY MASS INDEX: 29.12 KG/M2 | HEART RATE: 100 BPM | RESPIRATION RATE: 18 BRPM | WEIGHT: 215 LBS | OXYGEN SATURATION: 97 % | HEIGHT: 72 IN

## 2022-03-13 DIAGNOSIS — S61.215A LACERATION OF LEFT RING FINGER WITHOUT FOREIGN BODY WITHOUT DAMAGE TO NAIL, INITIAL ENCOUNTER: Primary | ICD-10-CM

## 2022-03-13 PROCEDURE — 12001 RPR S/N/AX/GEN/TRNK 2.5CM/<: CPT | Mod: F3

## 2022-03-13 PROCEDURE — 63600175 PHARM REV CODE 636 W HCPCS: Performed by: EMERGENCY MEDICINE

## 2022-03-13 PROCEDURE — 99284 EMERGENCY DEPT VISIT MOD MDM: CPT | Mod: 25,,, | Performed by: EMERGENCY MEDICINE

## 2022-03-13 PROCEDURE — 90471 IMMUNIZATION ADMIN: CPT | Performed by: EMERGENCY MEDICINE

## 2022-03-13 PROCEDURE — 99284 EMERGENCY DEPT VISIT MOD MDM: CPT | Mod: 25

## 2022-03-13 PROCEDURE — 90715 TDAP VACCINE 7 YRS/> IM: CPT | Performed by: EMERGENCY MEDICINE

## 2022-03-13 PROCEDURE — 99284 PR EMERGENCY DEPT VISIT,LEVEL IV: ICD-10-PCS | Mod: 25,,, | Performed by: EMERGENCY MEDICINE

## 2022-03-13 PROCEDURE — 12001 PR RESUPERF WND BODY <2.5CM: ICD-10-PCS | Mod: ,,, | Performed by: EMERGENCY MEDICINE

## 2022-03-13 PROCEDURE — 12001 RPR S/N/AX/GEN/TRNK 2.5CM/<: CPT | Mod: ,,, | Performed by: EMERGENCY MEDICINE

## 2022-03-13 PROCEDURE — 25000003 PHARM REV CODE 250: Performed by: EMERGENCY MEDICINE

## 2022-03-13 RX ORDER — LIDOCAINE HYDROCHLORIDE 10 MG/ML
5 INJECTION, SOLUTION EPIDURAL; INFILTRATION; INTRACAUDAL; PERINEURAL
Status: COMPLETED | OUTPATIENT
Start: 2022-03-13 | End: 2022-03-13

## 2022-03-13 RX ORDER — ACETAMINOPHEN 500 MG
1000 TABLET ORAL
Status: COMPLETED | OUTPATIENT
Start: 2022-03-13 | End: 2022-03-13

## 2022-03-13 RX ADMIN — ACETAMINOPHEN 1000 MG: 500 TABLET ORAL at 06:03

## 2022-03-13 RX ADMIN — TETANUS TOXOID, REDUCED DIPHTHERIA TOXOID AND ACELLULAR PERTUSSIS VACCINE, ADSORBED 0.5 ML: 5; 2.5; 8; 8; 2.5 SUSPENSION INTRAMUSCULAR at 06:03

## 2022-03-13 RX ADMIN — LIDOCAINE HYDROCHLORIDE 50 MG: 10 INJECTION, SOLUTION EPIDURAL; INFILTRATION; INTRACAUDAL at 06:03

## 2022-03-13 NOTE — DISCHARGE INSTRUCTIONS
1). Watch for redness, drainage, warmth, tenderness, or fever and return to ED or see primary care physician if any of those sign/symptoms develop. If you have any concerns, please follow-up with your primary physician or return to the ED.    2).  You have absorbable sutures in your finger, we have placed 8 stitches that are absorbable and will fall out on their own in the next 7-10 days.  Do not remove the sutures.    3). Keep wound clean and dry for the next 24 hours, then may shower and/or clean gently with water. Apply antibiotic ointment twice daily for the next week.     4). Please avoid any submersion of wound in water (to include baths, pools, hot tubs, lakes) until sutures removed.    5). Please apply sunscreen to the area after sutures removed for the next year as sunscreen my aid in reducing the appearance of scaring.      Our goal in the emergency department is to always give you outstanding care and exceptional service. You may receive a survey by mail or e-mail in the next week regarding your experience in our ED. We would greatly appreciate your completing and returning the survey. Your feedback provides us with a way to recognize our staff who give very good care and it helps us learn how to improve when your experience was below our aspiration of excellence.

## 2022-03-13 NOTE — ED PROVIDER NOTES
Encounter Date: 3/13/2022       History     Chief Complaint   Patient presents with    Laceration     Tip of L ring finger,      HPI   Simeon Bishop is a 56-year-old male with a history of kidney stones in past presenting with laceration to his left ring finger.  Patient states 1 hour prior to arrival he was working on his fence when he cut his tip of his palmar aspect of his left ring finger on a skil saw.  He denies any numbness, tingling or any severe pain.  Patient did take aspirin prior to arrival for pain control.  He denies any fevers, chills or any other injury.  Denies any LOC, head injury, neck pain, back pain or chest pain.  He is able to move his fingers without any issues.  The laceration is superficial, and he denies any paresthesias, numbness, tingling.  Patient is right handed.  Unknown tetanus status.    Review of patient's allergies indicates:  No Known Allergies  Past Medical History:   Diagnosis Date    Disorder of kidney and ureter     Iritis     x 2     Kidney stones      Past Surgical History:   Procedure Laterality Date    COLONOSCOPY N/A 11/15/2016    Procedure: COLONOSCOPY-Miralax split prep;  Surgeon: Carlo Ward MD;  Location: [facility];  Service: Endoscopy;  Laterality: N/A;    EYE SURGERY       Family History   Problem Relation Age of Onset    Heart disease Mother     Liver cancer Father     Alcohol abuse Father     Colon cancer Father     Prostate cancer Neg Hx     Heart attacks under age 50 Neg Hx      Social History     Tobacco Use    Smoking status: Former Smoker    Smokeless tobacco: Never Used   Substance Use Topics    Alcohol use: Yes     Alcohol/week: 13.0 standard drinks     Types: 5 Glasses of wine, 4 Cans of beer, 4 Shots of liquor per week    Drug use: No     Review of Systems   Constitutional: Negative for chills and fever.   HENT: Negative for congestion and sore throat.    Eyes: Negative for photophobia and visual disturbance.    Respiratory: Negative for chest tightness and shortness of breath.    Cardiovascular: Negative for chest pain and palpitations.   Gastrointestinal: Negative for abdominal pain, nausea and vomiting.   Genitourinary: Negative for dysuria and flank pain.   Musculoskeletal: Negative for myalgias and neck stiffness.   Skin: Positive for color change and wound.        Laceration   Neurological: Negative for facial asymmetry, light-headedness and headaches.   Psychiatric/Behavioral: Negative for self-injury. The patient is not nervous/anxious.    All other systems reviewed and are negative.      Physical Exam     Initial Vitals [03/13/22 1729]   BP Pulse Resp Temp SpO2   (!) 133/102 100 18 99 °F (37.2 °C) 97 %      MAP       --         Physical Exam    Nursing note and vitals reviewed.      Gen/Constitutional: Interactive. No acute distress  Head: Normocephalic, Atraumatic  Neck: supple, no masses or LAD, no JVD  Eyes: PERRLA, conjunctiva clear  Ears, Nose and Throat: No rhinorrhea or stridor.  Cardiac:  Regular rate, Reg Rhythm, No murmur  Pulmonary: CTA Bilat, no wheezes, rhonchi, rales.  No increased work of breathing.  GI: Abdomen soft, non-tender, non-distended; no rebound or guarding  : No CVA tenderness.  Musculoskeletal: Extremities warm, well perfused, no erythema, no edema  Skin: No rashes, cyanosis or jaundice.  Left hand ring finger:  2 cm laceration on the palmar aspect superficial, with no tendon injury.  Bleeding controlled, 2+ distal pulses, sensory intact to light touch, full range of motion  Neuro: Alert and Oriented x 3; No focal motor or sensory deficits.    Psych: Normal affect      ED Course   Lac Repair    Date/Time: 3/13/2022 6:08 PM  Performed by: Jacob Bhatti DO  Authorized by: Jacob Bhatti DO     Consent:     Consent obtained:  Verbal    Consent given by:  Patient    Risks, benefits, and alternatives were discussed: yes      Risks discussed:  Infection, poor cosmetic result and need  for additional repair  Universal protocol:     Procedure explained and questions answered to patient or proxy's satisfaction: yes      Patient identity confirmed:  Verbally with patient, arm band and provided demographic data  Anesthesia:     Anesthesia method:  Nerve block    Block needle gauge:  27 G    Block anesthetic:  Lidocaine 1% w/o epi    Block technique:  Digital block    Block injection procedure:  Anatomic landmarks identified, anatomic landmarks palpated, negative aspiration for blood and introduced needle    Block outcome:  Anesthesia achieved  Laceration details:     Location:  Finger    Finger location:  L ring finger    Length (cm):  2  Pre-procedure details:     Preparation:  Patient was prepped and draped in usual sterile fashion  Exploration:     Hemostasis achieved with:  Direct pressure    Imaging obtained: bedside ultrasound      Wound exploration: wound explored through full range of motion and entire depth of wound visualized      Contaminated: no    Treatment:     Area cleansed with:  Saline    Amount of cleaning:  Standard    Irrigation solution:  Sterile saline    Irrigation volume:  500    Irrigation method:  Pressure wash    Visualized foreign bodies/material removed: no    Skin repair:     Repair method:  Sutures    Suture size:  5-0    Suture material:  Plain gut    Suture technique:  Simple interrupted and horizontal mattress    Number of sutures:  9  Approximation:     Approximation:  Close  Repair type:     Repair type:  Intermediate  Post-procedure details:     Dressing:  Sterile dressing and non-adherent dressing    Procedure completion:  Tolerated      Labs Reviewed - No data to display       Imaging Results    None          Medications   acetaminophen tablet 1,000 mg (1,000 mg Oral Given 3/13/22 1805)   LIDOcaine (PF) 10 mg/ml (1%) injection 50 mg (50 mg Infiltration Given by Other 3/13/22 1806)   Tdap (BOOSTRIX) vaccine injection 0.5 mL (0.5 mLs Intramuscular Given 3/13/22  0147)     Medical Decision Making:   History:   Old Medical Records: I decided to obtain old medical records.  Initial Assessment:   Simeon Bishop is a 56-year-old male with a history of kidney stones in past presenting with laceration to his left ring finger.    Differential Diagnosis:   Superficial laceration, deep laceration, tendon injury, vascular injury, nail bed injury    Afebrile vital signs stable.  Bleeding controlled at the small laceration site.  This is a 2 cm superficial laceration on the palmar fat pad of the ring finger of the left hand tip of the finger.  Bleeding is controlled with pressure.  A digital block was obtained with good anesthesia.  The laceration was repaired primarily using absorbable sutures.  Wound care instructions, follow-up plan in place.  Tetanus status updated. Patient agreeable to discharge plan. Strict ED precautions and return instructions discussed at length and patient verbalized understanding. All questions were answered and ample time was given for questions.      Complexity:  Moderate high risk                    Clinical Impression:   Final diagnoses:  [S67.855A] Laceration of left ring finger without foreign body without damage to nail, initial encounter (Primary)          ED Disposition Condition    Discharge Stable        ED Prescriptions     None        Follow-up Information     Follow up With Specialties Details Why Contact Info    Krissy Daniels MD Family Medicine Schedule an appointment as soon as possible for a visit in 1 week For wound re-check as needed [address] [facility] [address]  [phone]           Jacob Bhatti DO, FAAEM  Emergency Staff Physician   Dept of Emergency Medicine   [facility]  Spectralink: [phone]        Disclaimer: This note has been generated using voice-recognition software. There may be typographical errors that have been missed during proof-reading.       Jacob Bhatti DO  03/14/22 3611

## 2022-03-17 ENCOUNTER — PATIENT MESSAGE (OUTPATIENT)
Dept: ADMINISTRATIVE | Facility: HOSPITAL | Age: 57
End: 2022-03-17
Payer: COMMERCIAL

## 2022-05-03 ENCOUNTER — PATIENT MESSAGE (OUTPATIENT)
Dept: RESEARCH | Facility: HOSPITAL | Age: 57
End: 2022-05-03
Payer: COMMERCIAL

## 2022-05-28 ENCOUNTER — TELEPHONE (OUTPATIENT)
Dept: ADMINISTRATIVE | Facility: OTHER | Age: 57
End: 2022-05-28
Payer: COMMERCIAL

## 2022-08-12 ENCOUNTER — LAB VISIT (OUTPATIENT)
Dept: LAB | Facility: HOSPITAL | Age: 57
End: 2022-08-12
Payer: COMMERCIAL

## 2022-08-12 ENCOUNTER — OFFICE VISIT (OUTPATIENT)
Dept: INTERNAL MEDICINE | Facility: CLINIC | Age: 57
End: 2022-08-12
Payer: COMMERCIAL

## 2022-08-12 VITALS
WEIGHT: 201.25 LBS | HEIGHT: 72 IN | HEART RATE: 89 BPM | SYSTOLIC BLOOD PRESSURE: 132 MMHG | DIASTOLIC BLOOD PRESSURE: 72 MMHG | BODY MASS INDEX: 27.26 KG/M2 | OXYGEN SATURATION: 98 %

## 2022-08-12 DIAGNOSIS — K63.5 HYPERPLASTIC COLONIC POLYP, UNSPECIFIED PART OF COLON: ICD-10-CM

## 2022-08-12 DIAGNOSIS — Z00.00 ANNUAL PHYSICAL EXAM: Primary | ICD-10-CM

## 2022-08-12 DIAGNOSIS — F41.9 ANXIETY: ICD-10-CM

## 2022-08-12 DIAGNOSIS — Z00.00 ANNUAL PHYSICAL EXAM: ICD-10-CM

## 2022-08-12 DIAGNOSIS — E78.5 HYPERLIPIDEMIA, UNSPECIFIED HYPERLIPIDEMIA TYPE: ICD-10-CM

## 2022-08-12 DIAGNOSIS — S16.1XXA STRAIN OF NECK MUSCLE, INITIAL ENCOUNTER: ICD-10-CM

## 2022-08-12 PROBLEM — S46.819A TRAPEZIUS STRAIN: Status: ACTIVE | Noted: 2022-08-12

## 2022-08-12 LAB
ALBUMIN SERPL BCP-MCNC: 4.2 G/DL (ref 3.5–5.2)
ALP SERPL-CCNC: 70 U/L (ref 55–135)
ALT SERPL W/O P-5'-P-CCNC: 26 U/L (ref 10–44)
ANION GAP SERPL CALC-SCNC: 7 MMOL/L (ref 8–16)
AST SERPL-CCNC: 17 U/L (ref 10–40)
BASOPHILS # BLD AUTO: 0.06 K/UL (ref 0–0.2)
BASOPHILS NFR BLD: 1 % (ref 0–1.9)
BILIRUB SERPL-MCNC: 0.7 MG/DL (ref 0.1–1)
BUN SERPL-MCNC: 18 MG/DL (ref 6–20)
CALCIUM SERPL-MCNC: 9.7 MG/DL (ref 8.7–10.5)
CHLORIDE SERPL-SCNC: 105 MMOL/L (ref 95–110)
CHOLEST SERPL-MCNC: 198 MG/DL (ref 120–199)
CHOLEST/HDLC SERPL: 4.8 {RATIO} (ref 2–5)
CO2 SERPL-SCNC: 26 MMOL/L (ref 23–29)
CREAT SERPL-MCNC: 1.1 MG/DL (ref 0.5–1.4)
DIFFERENTIAL METHOD: NORMAL
EOSINOPHIL # BLD AUTO: 0.2 K/UL (ref 0–0.5)
EOSINOPHIL NFR BLD: 3 % (ref 0–8)
ERYTHROCYTE [DISTWIDTH] IN BLOOD BY AUTOMATED COUNT: 12 % (ref 11.5–14.5)
EST. GFR  (NO RACE VARIABLE): >60 ML/MIN/1.73 M^2
ESTIMATED AVG GLUCOSE: 105 MG/DL (ref 68–131)
GLUCOSE SERPL-MCNC: 87 MG/DL (ref 70–110)
HBA1C MFR BLD: 5.3 % (ref 4–5.6)
HCT VFR BLD AUTO: 41.3 % (ref 40–54)
HDLC SERPL-MCNC: 41 MG/DL (ref 40–75)
HDLC SERPL: 20.7 % (ref 20–50)
HGB BLD-MCNC: 14.1 G/DL (ref 14–18)
IMM GRANULOCYTES # BLD AUTO: 0.02 K/UL (ref 0–0.04)
IMM GRANULOCYTES NFR BLD AUTO: 0.3 % (ref 0–0.5)
LDLC SERPL CALC-MCNC: 108.2 MG/DL (ref 63–159)
LYMPHOCYTES # BLD AUTO: 1.4 K/UL (ref 1–4.8)
LYMPHOCYTES NFR BLD: 24.2 % (ref 18–48)
MCH RBC QN AUTO: 30.3 PG (ref 27–31)
MCHC RBC AUTO-ENTMCNC: 34.1 G/DL (ref 32–36)
MCV RBC AUTO: 89 FL (ref 82–98)
MONOCYTES # BLD AUTO: 0.6 K/UL (ref 0.3–1)
MONOCYTES NFR BLD: 9.4 % (ref 4–15)
NEUTROPHILS # BLD AUTO: 3.7 K/UL (ref 1.8–7.7)
NEUTROPHILS NFR BLD: 62.1 % (ref 38–73)
NONHDLC SERPL-MCNC: 157 MG/DL
NRBC BLD-RTO: 0 /100 WBC
PLATELET # BLD AUTO: 240 K/UL (ref 150–450)
PMV BLD AUTO: 9.6 FL (ref 9.2–12.9)
POTASSIUM SERPL-SCNC: 4.6 MMOL/L (ref 3.5–5.1)
PROT SERPL-MCNC: 6.5 G/DL (ref 6–8.4)
RBC # BLD AUTO: 4.66 M/UL (ref 4.6–6.2)
SODIUM SERPL-SCNC: 138 MMOL/L (ref 136–145)
TRIGL SERPL-MCNC: 244 MG/DL (ref 30–150)
TSH SERPL DL<=0.005 MIU/L-ACNC: 0.88 UIU/ML (ref 0.4–4)
WBC # BLD AUTO: 5.94 K/UL (ref 3.9–12.7)

## 2022-08-12 PROCEDURE — 87389 HIV-1 AG W/HIV-1&-2 AB AG IA: CPT | Performed by: INTERNAL MEDICINE

## 2022-08-12 PROCEDURE — 3078F PR MOST RECENT DIASTOLIC BLOOD PRESSURE < 80 MM HG: ICD-10-PCS | Mod: CPTII,S$GLB,, | Performed by: INTERNAL MEDICINE

## 2022-08-12 PROCEDURE — 84443 ASSAY THYROID STIM HORMONE: CPT | Performed by: INTERNAL MEDICINE

## 2022-08-12 PROCEDURE — 99999 PR PBB SHADOW E&M-EST. PATIENT-LVL III: ICD-10-PCS | Mod: PBBFAC,,, | Performed by: INTERNAL MEDICINE

## 2022-08-12 PROCEDURE — 80053 COMPREHEN METABOLIC PANEL: CPT | Performed by: INTERNAL MEDICINE

## 2022-08-12 PROCEDURE — 3008F BODY MASS INDEX DOCD: CPT | Mod: CPTII,S$GLB,, | Performed by: INTERNAL MEDICINE

## 2022-08-12 PROCEDURE — 99999 PR PBB SHADOW E&M-EST. PATIENT-LVL III: CPT | Mod: PBBFAC,,, | Performed by: INTERNAL MEDICINE

## 2022-08-12 PROCEDURE — 83036 HEMOGLOBIN GLYCOSYLATED A1C: CPT | Performed by: INTERNAL MEDICINE

## 2022-08-12 PROCEDURE — 99396 PREV VISIT EST AGE 40-64: CPT | Mod: S$GLB,,, | Performed by: INTERNAL MEDICINE

## 2022-08-12 PROCEDURE — 3075F PR MOST RECENT SYSTOLIC BLOOD PRESS GE 130-139MM HG: ICD-10-PCS | Mod: CPTII,S$GLB,, | Performed by: INTERNAL MEDICINE

## 2022-08-12 PROCEDURE — 36415 COLL VENOUS BLD VENIPUNCTURE: CPT | Performed by: INTERNAL MEDICINE

## 2022-08-12 PROCEDURE — 3075F SYST BP GE 130 - 139MM HG: CPT | Mod: CPTII,S$GLB,, | Performed by: INTERNAL MEDICINE

## 2022-08-12 PROCEDURE — 80061 LIPID PANEL: CPT | Performed by: INTERNAL MEDICINE

## 2022-08-12 PROCEDURE — 3078F DIAST BP <80 MM HG: CPT | Mod: CPTII,S$GLB,, | Performed by: INTERNAL MEDICINE

## 2022-08-12 PROCEDURE — 3008F PR BODY MASS INDEX (BMI) DOCUMENTED: ICD-10-PCS | Mod: CPTII,S$GLB,, | Performed by: INTERNAL MEDICINE

## 2022-08-12 PROCEDURE — 85025 COMPLETE CBC W/AUTO DIFF WBC: CPT | Performed by: INTERNAL MEDICINE

## 2022-08-12 PROCEDURE — 99396 PR PREVENTIVE VISIT,EST,40-64: ICD-10-PCS | Mod: S$GLB,,, | Performed by: INTERNAL MEDICINE

## 2022-08-12 RX ORDER — CYCLOBENZAPRINE HCL 5 MG
5 TABLET ORAL NIGHTLY
Qty: 30 TABLET | Refills: 1 | Status: SHIPPED | OUTPATIENT
Start: 2022-08-12

## 2022-08-12 RX ORDER — MIRTAZAPINE 15 MG/1
15 TABLET, FILM COATED ORAL NIGHTLY
Qty: 90 TABLET | Refills: 3 | Status: SHIPPED | OUTPATIENT
Start: 2022-08-12 | End: 2024-03-04

## 2022-08-12 NOTE — PROGRESS NOTES
Subjective:       Patient ID: Simeon Bishop is a 56 y.o. male.    Chief Complaint: Establish Care    HPI     Mr. Bishop is a 55 yo male who presents to establish care.     He has a hx of anxiety and has been on and off Mirtazapine in the last few years. He has currently been of medication for one year and was doing well until 3-4 weeks ago he began having panic attacks again. He states he has episodes brought on by stress which he feels like he cant catch his breath and strain in his neck.  He has had panic attacks in the past and cardiac workup including EKG has always been normal. Panic attacks resolve while he is Mirtazapine.     Surgical and social hx reviewed.     Health Maintenance:  Colon Cancer Screening: Had Cscope in 2016 with hyperplastic polyp removed from anus. Was in in 2021   HIV: order today   Hep C: done 10 2016 was negative   Lipids: Order today   Vaccines: Tdap 3/2022, Flu due in 9/2022, Covid x2 getting booster today        Review of Systems   Constitutional: Negative for chills, diaphoresis, fatigue and fever.   HENT: Negative for rhinorrhea, sneezing and sore throat.    Respiratory: Negative for cough, chest tightness, shortness of breath and wheezing.    Cardiovascular: Negative for chest pain, palpitations and leg swelling.   Gastrointestinal: Negative for abdominal pain, blood in stool, diarrhea, nausea and vomiting.   Musculoskeletal: Negative for arthralgias and back pain.   Neurological: Negative for dizziness, weakness, light-headedness and headaches.   Psychiatric/Behavioral: Negative for agitation and behavioral problems.           Past Medical History:   Diagnosis Date    Disorder of kidney and ureter     Iritis     x 2     Kidney stones      Past Surgical History:   Procedure Laterality Date    COLONOSCOPY N/A 11/15/2016    Procedure: COLONOSCOPY-Miralax split prep;  Surgeon: Carlo Ward MD;  Location: [facility];  Service: Endoscopy;  Laterality: N/A;    EYE  SURGERY        Patient Active Problem List   Diagnosis    Hyperlipidemia    Family history of colon cancer    Colon polyp, hyperplastic    Trapezius strain        Objective:      Physical Exam  Constitutional:       Appearance: Normal appearance.   HENT:      Head: Normocephalic and atraumatic.   Cardiovascular:      Rate and Rhythm: Normal rate and regular rhythm.      Heart sounds: Normal heart sounds.   Pulmonary:      Effort: Pulmonary effort is normal.      Breath sounds: Normal breath sounds. No stridor. No wheezing or rales.   Abdominal:      General: Abdomen is flat.      Palpations: Abdomen is soft. There is no mass.      Tenderness: There is no abdominal tenderness.   Skin:     General: Skin is warm and dry.   Neurological:      Mental Status: He is alert and oriented to person, place, and time.   Psychiatric:         Mood and Affect: Mood normal.         Assessment:       Problem List Items Addressed This Visit        Cardiac/Vascular    Hyperlipidemia       GI    Colon polyp, hyperplastic    Relevant Orders    Case Request Endoscopy: COLONOSCOPY (Completed)       Orthopedic    Neck strain    Relevant Medications    cyclobenzaprine (FLEXERIL) 5 MG tablet      Other Visit Diagnoses     Annual physical exam    -  Primary    Relevant Orders    Comprehensive Metabolic Panel    CBC Auto Differential    Lipid Panel    Hemoglobin A1C    TSH    HIV 1/2 Ag/Ab (4th Gen)    Anxiety        Relevant Medications    mirtazapine (REMERON) 15 MG tablet          Plan:         Simeon was seen today for establish care.    Diagnoses and all orders for this visit:    Annual physical exam  Colon Cancer Screening: Had Cscope in 2016 with hyperplastic polyp removed from anus. Was in in 2021   HIV: order today   Hep C: done 10 2016 was negative   Lipids: Order today   Vaccines: Tdap 3/2022, Flu due in 9/2022, Covid x2 getting booster today   -order labs today     Hyperplastic colonic polyp, unspecified part of colon  -     Has  family hx and had scope in 2016 with removal of a hyperplastic polyp. Was due for repeat in 2021.   -order and schedule colonoscopy today     Hyperlipidemia, unspecified hyperlipidemia type  -check lipids today  -not currently on any medications     Strain of neck muscle, initial encounter  -has been having pain in neck while sitting at his desk working.   -     cyclobenzaprine (FLEXERIL) 5 MG tablet; Take 1 tablet (5 mg total) by mouth nightly.    Anxiety  -restart mirtazapine today.                Marguerite Barrera MD   Internal Medicine   Primary Care Physician

## 2022-08-15 LAB — HIV 1+2 AB+HIV1 P24 AG SERPL QL IA: NEGATIVE

## 2022-08-22 ENCOUNTER — PATIENT MESSAGE (OUTPATIENT)
Dept: INTERNAL MEDICINE | Facility: CLINIC | Age: 57
End: 2022-08-22
Payer: COMMERCIAL

## 2022-08-29 DIAGNOSIS — Z12.11 SCREENING FOR COLON CANCER: Primary | ICD-10-CM

## 2022-12-21 ENCOUNTER — CLINICAL SUPPORT (OUTPATIENT)
Dept: ENDOSCOPY | Facility: HOSPITAL | Age: 57
End: 2022-12-21
Payer: COMMERCIAL

## 2022-12-21 VITALS — HEIGHT: 72 IN | BODY MASS INDEX: 27.09 KG/M2 | WEIGHT: 200 LBS

## 2022-12-21 DIAGNOSIS — Z12.11 SCREENING FOR COLON CANCER: ICD-10-CM

## 2022-12-21 RX ORDER — SODIUM, POTASSIUM,MAG SULFATES 17.5-3.13G
1 SOLUTION, RECONSTITUTED, ORAL ORAL DAILY
Qty: 1 KIT | Refills: 0 | Status: SHIPPED | OUTPATIENT
Start: 2022-12-21 | End: 2022-12-23

## 2023-01-10 ENCOUNTER — ANESTHESIA (OUTPATIENT)
Dept: ENDOSCOPY | Facility: HOSPITAL | Age: 58
End: 2023-01-10
Payer: COMMERCIAL

## 2023-01-10 ENCOUNTER — ANESTHESIA EVENT (OUTPATIENT)
Dept: ENDOSCOPY | Facility: HOSPITAL | Age: 58
End: 2023-01-10
Payer: COMMERCIAL

## 2023-01-10 ENCOUNTER — HOSPITAL ENCOUNTER (OUTPATIENT)
Facility: HOSPITAL | Age: 58
Discharge: HOME | End: 2023-01-10
Attending: STUDENT IN AN ORGANIZED HEALTH CARE EDUCATION/TRAINING PROGRAM | Admitting: STUDENT IN AN ORGANIZED HEALTH CARE EDUCATION/TRAINING PROGRAM
Payer: COMMERCIAL

## 2023-01-10 VITALS
TEMPERATURE: 98 F | SYSTOLIC BLOOD PRESSURE: 139 MMHG | OXYGEN SATURATION: 99 % | RESPIRATION RATE: 20 BRPM | HEIGHT: 72 IN | DIASTOLIC BLOOD PRESSURE: 98 MMHG | WEIGHT: 200 LBS | HEART RATE: 76 BPM | BODY MASS INDEX: 27.09 KG/M2

## 2023-01-10 DIAGNOSIS — Z12.11 COLON CANCER SCREENING: ICD-10-CM

## 2023-01-10 DIAGNOSIS — K63.5 HYPERPLASTIC COLONIC POLYP, UNSPECIFIED PART OF COLON: Primary | ICD-10-CM

## 2023-01-10 PROCEDURE — 45380 PR COLONOSCOPY,BIOPSY: ICD-10-PCS | Mod: 33,59,, | Performed by: STUDENT IN AN ORGANIZED HEALTH CARE EDUCATION/TRAINING PROGRAM

## 2023-01-10 PROCEDURE — 45380 COLONOSCOPY AND BIOPSY: CPT | Mod: PT,59 | Performed by: STUDENT IN AN ORGANIZED HEALTH CARE EDUCATION/TRAINING PROGRAM

## 2023-01-10 PROCEDURE — 88305 TISSUE EXAM BY PATHOLOGIST: CPT | Mod: 26,,, | Performed by: PATHOLOGY

## 2023-01-10 PROCEDURE — E9220 PRA ENDO ANESTHESIA: HCPCS | Mod: 33,,, | Performed by: NURSE ANESTHETIST, CERTIFIED REGISTERED

## 2023-01-10 PROCEDURE — 45385 COLONOSCOPY W/LESION REMOVAL: CPT | Mod: 33,,, | Performed by: STUDENT IN AN ORGANIZED HEALTH CARE EDUCATION/TRAINING PROGRAM

## 2023-01-10 PROCEDURE — E9220 PRA ENDO ANESTHESIA: ICD-10-PCS | Mod: 33,,, | Performed by: NURSE ANESTHETIST, CERTIFIED REGISTERED

## 2023-01-10 PROCEDURE — 27201012 HC FORCEPS, HOT/COLD, DISP: Performed by: STUDENT IN AN ORGANIZED HEALTH CARE EDUCATION/TRAINING PROGRAM

## 2023-01-10 PROCEDURE — 37000009 HC ANESTHESIA EA ADD 15 MINS: Performed by: STUDENT IN AN ORGANIZED HEALTH CARE EDUCATION/TRAINING PROGRAM

## 2023-01-10 PROCEDURE — 37000008 HC ANESTHESIA 1ST 15 MINUTES: Performed by: STUDENT IN AN ORGANIZED HEALTH CARE EDUCATION/TRAINING PROGRAM

## 2023-01-10 PROCEDURE — 25000003 PHARM REV CODE 250: Performed by: STUDENT IN AN ORGANIZED HEALTH CARE EDUCATION/TRAINING PROGRAM

## 2023-01-10 PROCEDURE — 27201089 HC SNARE, DISP (ANY): Performed by: STUDENT IN AN ORGANIZED HEALTH CARE EDUCATION/TRAINING PROGRAM

## 2023-01-10 PROCEDURE — 45380 COLONOSCOPY AND BIOPSY: CPT | Mod: 33,59,, | Performed by: STUDENT IN AN ORGANIZED HEALTH CARE EDUCATION/TRAINING PROGRAM

## 2023-01-10 PROCEDURE — 88305 TISSUE EXAM BY PATHOLOGIST: CPT | Performed by: PATHOLOGY

## 2023-01-10 PROCEDURE — 45385 PR COLONOSCOPY,REMV LESN,SNARE: ICD-10-PCS | Mod: 33,,, | Performed by: STUDENT IN AN ORGANIZED HEALTH CARE EDUCATION/TRAINING PROGRAM

## 2023-01-10 PROCEDURE — 45385 COLONOSCOPY W/LESION REMOVAL: CPT | Mod: PT | Performed by: STUDENT IN AN ORGANIZED HEALTH CARE EDUCATION/TRAINING PROGRAM

## 2023-01-10 PROCEDURE — 88305 TISSUE EXAM BY PATHOLOGIST: ICD-10-PCS | Mod: 26,,, | Performed by: PATHOLOGY

## 2023-01-10 RX ORDER — LIDOCAINE HCL/PF 100 MG/5ML
SYRINGE (ML) INTRAVENOUS
Status: DISCONTINUED | OUTPATIENT
Start: 2023-01-10 | End: 2023-01-10

## 2023-01-10 RX ORDER — PROPOFOL 10 MG/ML
INJECTION, EMULSION INTRAVENOUS CONTINUOUS PRN
Status: DISCONTINUED | OUTPATIENT
Start: 2023-01-10 | End: 2023-01-10

## 2023-01-10 RX ORDER — PROPOFOL 10 MG/ML
INJECTION, EMULSION INTRAVENOUS
Status: DISCONTINUED | OUTPATIENT
Start: 2023-01-10 | End: 2023-01-10

## 2023-01-10 RX ORDER — SODIUM CHLORIDE 9 MG/ML
INJECTION, SOLUTION INTRAVENOUS CONTINUOUS
Status: DISCONTINUED | OUTPATIENT
Start: 2023-01-10 | End: 2023-01-10 | Stop reason: HOSPADM

## 2023-01-10 RX ADMIN — PROPOFOL 175 MCG/KG/MIN: 10 INJECTION, EMULSION INTRAVENOUS at 03:01

## 2023-01-10 RX ADMIN — Medication 100 MG: at 03:01

## 2023-01-10 RX ADMIN — SODIUM CHLORIDE: 0.9 INJECTION, SOLUTION INTRAVENOUS at 03:01

## 2023-01-10 RX ADMIN — PROPOFOL 80 MG: 10 INJECTION, EMULSION INTRAVENOUS at 03:01

## 2023-01-10 RX ADMIN — PROPOFOL 30 MG: 10 INJECTION, EMULSION INTRAVENOUS at 03:01

## 2023-01-10 NOTE — PROVATION PATIENT INSTRUCTIONS
Discharge Summary/Instructions after an Endoscopic Procedure  Patient Name: Simeon Bishop  Patient MRN: [id number]  Patient YOB: 1965  Tuesday, January 10, 2023  Guru Barker MD  Dear patient,  As a result of recent federal legislation (The Federal Cures Act), you may   receive lab or pathology results from your procedure in your [facility]   account before your physician is able to contact you. Your physician or   their representative will relay the results to you with their   recommendations at their soonest availability.  Thank you,  RESTRICTIONS:  During your procedure today, you received medications for sedation.  These   medications may affect your judgment, balance and coordination.  Therefore,   for 24 hours, you have the following restrictions:   - DO NOT drive a car, operate machinery, make legal/financial decisions,   sign important papers or drink alcohol.    ACTIVITY:  Today: no heavy lifting, straining or running due to procedural   sedation/anesthesia.  The following day: return to full activity including work.  DIET:  Eat and drink normally unless instructed otherwise.     TREATMENT FOR COMMON SIDE EFFECTS:  - Mild abdominal pain, nausea, belching, bloating or excessive gas:  rest,   eat lightly and use a heating pad.  - Sore Throat: treat with throat lozenges and/or gargle with warm salt   water.  - Because air was used during the procedure, expelling large amounts of air   from your rectum or belching is normal.  - If a bowel prep was taken, you may not have a bowel movement for 1-3 days.    This is normal.  SYMPTOMS TO WATCH FOR AND REPORT TO YOUR PHYSICIAN:  1. Abdominal pain or bloating, other than gas cramps.  2. Chest pain.  3. Back pain.  4. Signs of infection such as: chills or fever occurring within 24 hours   after the procedure.  5. Rectal bleeding, which would show as bright red, maroon, or black stools.   (A tablespoon of blood from the rectum is not serious, especially  if   hemorrhoids are present.)  6. Vomiting.  7. Weakness or dizziness.  GO DIRECTLY TO THE NEAREST EMERGENCY ROOM IF YOU HAVE ANY OF THE FOLLOWING:      Difficulty breathing              Chills and/or fever over 101 F   Persistent vomiting and/or vomiting blood   Severe abdominal pain   Severe chest pain   Black, tarry stools   Bleeding- more than one tablespoon   Any other symptom or condition that you feel may need urgent attention  Your doctor recommends these additional instructions:  If any biopsies were taken, your doctors clinic will contact you in 1 to 2   weeks with any results.  - Repeat colonoscopy in 5 years for surveillance.   - Patient has a contact number available for emergencies.  The signs and   symptoms of potential delayed complications were discussed with the   patient.  Return to normal activities tomorrow.  Written discharge   instructions were provided to the patient.   - The findings and recommendations were discussed with the patient.   - Discharge patient to home.   - Await pathology results.  For questions, problems or results please call your physician - Guru Barker MD at Work:  [phone].  [facility] [place], EMERGENCY ROOM PHONE NUMBER: [phone]  IF A COMPLICATION OR EMERGENCY SITUATION ARISES AND YOU ARE UNABLE TO REACH   YOUR PHYSICIAN - GO DIRECTLY TO THE EMERGENCY ROOM.  Guru aBrker MD  1/10/2023 3:45:10 PM  This report has been verified and signed electronically.  Dear patient,  As a result of recent federal legislation (The Federal Cures Act), you may   receive lab or pathology results from your procedure in your [facility]   account before your physician is able to contact you. Your physician or   their representative will relay the results to you with their   recommendations at their soonest availability.  Thank you,  PROVATION

## 2023-01-10 NOTE — TRANSFER OF CARE
Anesthesia Transfer of Care Note    Patient: Simeon Bishop    Procedure(s) Performed: Procedure(s) (LRB):  COLONOSCOPY (N/A)    Patient location: GI    Anesthesia Type: general    Transport from OR: Transported from OR on room air with adequate spontaneous ventilation    Post pain: adequate analgesia    Post assessment: no apparent anesthetic complications    Post vital signs: stable    Level of consciousness: sedated    Nausea/Vomiting: no nausea/vomiting    Complications: none    Transfer of care protocol was followed      Last vitals:   Visit Vitals  /70   Pulse 86   Temp 36   Resp 18   Ht 6' (1.829 m)   Wt 90.7 kg (200 lb)   SpO2 96%   BMI 27.12 kg/m²

## 2023-01-10 NOTE — PLAN OF CARE
Pt tolerated procedure without incident. To recovery via stretcher accompanied by CRNA. Side rails up x2. Pt's belongings under stretcher to be handed over to recovery RN

## 2023-01-10 NOTE — H&P
Short Stay Endoscopy History and Physical    PCP - Primary Doctor No  Referring Physician - Marguerite Barrera MD  [address] [place] [address]    Procedure - Colonoscopy  ASA - per anesthesia  Mallampati - per anesthesia  History of Anesthesia problems - no  Family history Anesthesia problems -  no   Plan of anesthesia - General    HPI  57 y.o. male  Reason for procedure:   Encounter for colonoscopy due to history of colonic polyp [Z12.11, Z86.010]  Encounter for colonoscopy in patient with family history of colon cancer [Z12.11, Z80.0]        ROS:  Constitutional: No fevers, chills, No weight loss  CV: No chest pain  Pulm: No cough, No shortness of breath  GI: see HPI    Medical History:  has a past medical history of Disorder of kidney and ureter, Iritis, and Kidney stones.    Surgical History:  has a past surgical history that includes Eye surgery and Colonoscopy (N/A, 11/15/2016).    Family History: family history includes Alcohol abuse in his father; Cancer in his father; Colon cancer (age of onset: 70) in his father; Heart disease in his mother; Liver cancer in his father..    Social History:  reports that he has quit smoking. He has never used smokeless tobacco. He reports current alcohol use of about 13.0 standard drinks per week. He reports that he does not use drugs.    Review of patient's allergies indicates:  No Known Allergies    Medications:   No medications prior to admission.       Physical Exam:    Vital Signs: There were no vitals filed for this visit.    General Appearance: Well appearing in no acute distress  Abdomen: Soft, non tender, non distended with normal bowel sounds, no masses    Labs:  Lab Results   Component Value Date    WBC 5.94 08/12/2022    HGB 14.1 08/12/2022    HCT 41.3 08/12/2022     08/12/2022    CHOL 198 08/12/2022    TRIG 244 (H) 08/12/2022    HDL 41 08/12/2022    ALT 26 08/12/2022    AST 17 08/12/2022     08/12/2022    K 4.6 08/12/2022    CL  105 08/12/2022    CREATININE 1.1 08/12/2022    BUN 18 08/12/2022    CO2 26 08/12/2022    TSH 0.882 08/12/2022    HGBA1C 5.3 08/12/2022       I have explained the risks and benefits of this endoscopic procedure to the patient including but not limited to bleeding, inflammation, infection, perforation, and death.      Guru Barker MD

## 2023-01-10 NOTE — ANESTHESIA PREPROCEDURE EVALUATION
01/10/2023  Pre-operative evaluation for Procedure(s) (LRB):  COLONOSCOPY (N/A)Encounter for colonoscopy due to history of colonic polyp [Z12.11, Z86.010]    Simeon Bishop is a 57 y.o. male     Patient Active Problem List   Diagnosis    Hyperlipidemia    Family history of colon cancer    Colon polyp, hyperplastic    Trapezius strain       Review of patient's allergies indicates:  No Known Allergies    No current facility-administered medications on file prior to encounter.     Current Outpatient Medications on File Prior to Encounter   Medication Sig Dispense Refill    cyclobenzaprine (FLEXERIL) 5 MG tablet Take 1 tablet (5 mg total) by mouth nightly. 30 tablet 1    mirtazapine (REMERON) 15 MG tablet Take 1 tablet (15 mg total) by mouth every evening. 90 tablet 3       Past Surgical History:   Procedure Laterality Date    COLONOSCOPY N/A 11/15/2016    Procedure: COLONOSCOPY-Miralax split prep;  Surgeon: Carlo Ward MD;  Location: [facility];  Service: Endoscopy;  Laterality: N/A;    EYE SURGERY         Social History     Socioeconomic History    Marital status:    Tobacco Use    Smoking status: Former    Smokeless tobacco: Never   Substance and Sexual Activity    Alcohol use: Yes     Alcohol/week: 13.0 standard drinks     Types: 5 Glasses of wine, 4 Cans of beer, 4 Shots of liquor per week    Drug use: No    Sexual activity: Yes     Partners: Female         CBC: No results for input(s): WBC, RBC, HGB, HCT, PLT, MCV, MCH, MCHC in the last 72 hours.    CMP: No results for input(s): NA, K, CL, CO2, BUN, CREATININE, GLU, MG, PHOS, CALCIUM, ALBUMIN, PROT, ALKPHOS, ALT, AST, BILITOT in the last 72 hours.    INR  No results for input(s): PT, INR, PROTIME, APTT in the last 72 hours.        Diagnostic Studies:      EKD Echo:  No results found for this or any previous  visit.        Pre-op Assessment    I have reviewed the Patient Summary Reports.     I have reviewed the Nursing Notes.       Review of Systems  Social:  Former Smoker Occasional THC use   Hematology/Oncology:  Hematology Normal   Oncology Normal     EENT/Dental:EENT/Dental Normal   Cardiovascular:   hyperlipidemia    Pulmonary:  Pulmonary Normal    Hepatic/GI:  Hepatic/GI Normal    Musculoskeletal:  Musculoskeletal Normal    Neurological:  Neurology Normal    Endocrine:  Endocrine Normal    Dermatological:  Skin Normal        Physical Exam  General: Well nourished, Cooperative, Alert and Oriented        Anesthesia Plan  Type of Anesthesia, risks & benefits discussed:    Anesthesia Type: Gen Natural Airway  Intra-op Monitoring Plan: Standard ASA Monitors  Induction:  IV  Informed Consent: Informed consent signed with the Patient and all parties understand the risks and agree with anesthesia plan.  All questions answered.   ASA Score: 2  Day of Surgery Review of History & Physical: H&P Update referred to the surgeon/provider.    Ready For Surgery From Anesthesia Perspective.     .

## 2023-01-11 NOTE — ANESTHESIA POSTPROCEDURE EVALUATION
Anesthesia Post Evaluation    Patient: Simeon Bishop    Procedure(s) Performed: Procedure(s) (LRB):  COLONOSCOPY (N/A)    Final Anesthesia Type: general      Patient location during evaluation: PACU  Patient participation: Yes- Able to Participate  Level of consciousness: awake and alert  Post-procedure vital signs: reviewed and stable  Pain management: adequate  Airway patency: patent    PONV status at discharge: No PONV  Anesthetic complications: no      Cardiovascular status: blood pressure returned to baseline  Respiratory status: unassisted  Hydration status: euvolemic  Follow-up not needed.          Vitals Value Taken Time   /98 01/10/23 1616   Temp 36.7 °C (98.1 °F) 01/10/23 1547   Pulse 76 01/10/23 1616   Resp 20 01/10/23 1616   SpO2 99 % 01/10/23 1616         Event Time   Out of Recovery 16:48:35         Pain/Clemencia Score: Clemencia Score: 10 (1/10/2023  4:01 PM)

## 2023-01-17 LAB
FINAL PATHOLOGIC DIAGNOSIS: NORMAL
GROSS: NORMAL
Lab: NORMAL

## 2023-01-27 ENCOUNTER — PATIENT MESSAGE (OUTPATIENT)
Dept: RESEARCH | Facility: HOSPITAL | Age: 58
End: 2023-01-27
Payer: COMMERCIAL

## 2023-02-08 ENCOUNTER — OFFICE VISIT (OUTPATIENT)
Dept: INTERNAL MEDICINE | Facility: CLINIC | Age: 58
End: 2023-02-08
Payer: COMMERCIAL

## 2023-02-08 ENCOUNTER — IMMUNIZATION (OUTPATIENT)
Dept: INTERNAL MEDICINE | Facility: CLINIC | Age: 58
End: 2023-02-08
Payer: COMMERCIAL

## 2023-02-08 VITALS
HEIGHT: 72 IN | HEART RATE: 97 BPM | WEIGHT: 203.69 LBS | OXYGEN SATURATION: 97 % | SYSTOLIC BLOOD PRESSURE: 130 MMHG | BODY MASS INDEX: 27.59 KG/M2 | DIASTOLIC BLOOD PRESSURE: 89 MMHG

## 2023-02-08 DIAGNOSIS — E78.5 HYPERLIPIDEMIA, UNSPECIFIED HYPERLIPIDEMIA TYPE: Primary | ICD-10-CM

## 2023-02-08 DIAGNOSIS — F41.1 GENERALIZED ANXIETY DISORDER: ICD-10-CM

## 2023-02-08 DIAGNOSIS — Z12.5 ENCOUNTER FOR PROSTATE CANCER SCREENING: ICD-10-CM

## 2023-02-08 PROCEDURE — 3075F PR MOST RECENT SYSTOLIC BLOOD PRESS GE 130-139MM HG: ICD-10-PCS | Mod: CPTII,S$GLB,, | Performed by: INTERNAL MEDICINE

## 2023-02-08 PROCEDURE — 99999 PR PBB SHADOW E&M-EST. PATIENT-LVL III: ICD-10-PCS | Mod: PBBFAC,,, | Performed by: INTERNAL MEDICINE

## 2023-02-08 PROCEDURE — 99213 PR OFFICE/OUTPT VISIT, EST, LEVL III, 20-29 MIN: ICD-10-PCS | Mod: S$GLB,,, | Performed by: INTERNAL MEDICINE

## 2023-02-08 PROCEDURE — 99213 OFFICE O/P EST LOW 20 MIN: CPT | Mod: S$GLB,,, | Performed by: INTERNAL MEDICINE

## 2023-02-08 PROCEDURE — 3079F DIAST BP 80-89 MM HG: CPT | Mod: CPTII,S$GLB,, | Performed by: INTERNAL MEDICINE

## 2023-02-08 PROCEDURE — 3079F PR MOST RECENT DIASTOLIC BLOOD PRESSURE 80-89 MM HG: ICD-10-PCS | Mod: CPTII,S$GLB,, | Performed by: INTERNAL MEDICINE

## 2023-02-08 PROCEDURE — 99999 PR PBB SHADOW E&M-EST. PATIENT-LVL III: CPT | Mod: PBBFAC,,, | Performed by: INTERNAL MEDICINE

## 2023-02-08 PROCEDURE — 3008F PR BODY MASS INDEX (BMI) DOCUMENTED: ICD-10-PCS | Mod: CPTII,S$GLB,, | Performed by: INTERNAL MEDICINE

## 2023-02-08 PROCEDURE — 3008F BODY MASS INDEX DOCD: CPT | Mod: CPTII,S$GLB,, | Performed by: INTERNAL MEDICINE

## 2023-02-08 PROCEDURE — 3075F SYST BP GE 130 - 139MM HG: CPT | Mod: CPTII,S$GLB,, | Performed by: INTERNAL MEDICINE

## 2023-02-08 NOTE — PROGRESS NOTES
Subjective:       Patient ID: Simeon Bishop is a 57 y.o. male.    Chief Complaint: No chief complaint on file.    HPI    Mr. Bishop is a 58 yo male who presents for follow up.     Feeling well today.     Mother passed away, wife had two car accidents since our last visit. Was having some GI issues during that time but have now resolved. Also had colonoscopy 2 weeks ago with polyps removed but no other abnormalities.        PMHX:  HLD: improved on last labs. Triglycerides remain elevated. Working on diet.   Generalized Anxiety disorder: we restarted mirtazapine last visit and he doing much better. Had been having panic attacks prior to restarting and had cardiac workup that was negative.       Surgical and social hx reviewed.      Health Maintenance:  Colon Cancer Screening: Just recently had colonoscopy January 2023 with benign polyps removed. Due in 2028.   HIV: negative 2022   Hep C: done 10 2016 was negative   Lipids: Order today   Vaccines: Tdap 3/2022, Flu due in 9/2022, Covid x2 getting booster today       Works in [employer]. . Former smoker.         Review of Systems   Constitutional:  Negative for chills, diaphoresis, fatigue and fever.   HENT:  Negative for rhinorrhea, sneezing and sore throat.    Respiratory:  Negative for cough, chest tightness, shortness of breath and wheezing.    Cardiovascular:  Negative for chest pain, palpitations and leg swelling.   Gastrointestinal:  Negative for abdominal pain, blood in stool, diarrhea, nausea and vomiting.   Musculoskeletal:  Negative for arthralgias and back pain.   Neurological:  Negative for dizziness, weakness, light-headedness and headaches.   Psychiatric/Behavioral:  Negative for agitation and behavioral problems.          Past Medical History:   Diagnosis Date    Disorder of kidney and ureter     Iritis     x 2     Kidney stones      Past Surgical History:   Procedure Laterality Date    COLONOSCOPY N/A 11/15/2016    Procedure:  COLONOSCOPY-Miralax split prep;  Surgeon: Carlo Ward MD;  Location: [facility] ENDO;  Service: Endoscopy;  Laterality: N/A;    COLONOSCOPY N/A 1/10/2023    Procedure: COLONOSCOPY;  Surgeon: Guru Barker MD;  Location: [facility] (4TH FLR);  Service: Endoscopy;  Laterality: N/A;  instructions sent to [facility]  pre call done    EYE SURGERY        Patient Active Problem List   Diagnosis    Hyperlipidemia    Family history of colon cancer    Colon polyp, hyperplastic    Trapezius strain        Objective:      Physical Exam  Constitutional:       Appearance: Normal appearance.   HENT:      Head: Normocephalic and atraumatic.   Cardiovascular:      Rate and Rhythm: Normal rate and regular rhythm.      Heart sounds: Normal heart sounds.   Pulmonary:      Effort: Pulmonary effort is normal.      Breath sounds: Normal breath sounds. No stridor. No wheezing or rales.   Abdominal:      General: Abdomen is flat.      Palpations: Abdomen is soft. There is no mass.      Tenderness: There is no abdominal tenderness.   Skin:     General: Skin is warm and dry.   Neurological:      Mental Status: He is alert and oriented to person, place, and time.   Psychiatric:         Mood and Affect: Mood normal.       Assessment:       Problem List Items Addressed This Visit          Cardiac/Vascular    Hyperlipidemia - Primary    Relevant Orders    Comprehensive Metabolic Panel    Lipid Panel    TSH     Other Visit Diagnoses       Encounter for prostate cancer screening        Relevant Orders    PSA, SCREENING    Generalized anxiety disorder                Plan:         Diagnoses and all orders for this visit:    Hyperlipidemia, unspecified hyperlipidemia type  improved on last labs. Triglycerides remain elevated. Working on diet. Repeat labs in 6 months     Encounter for prostate cancer screening  -     PSA, SCREENING; Future  Check PSA in 6 months with annual exam     Generalized anxiety disorder  Much improved  since starting mirtazapine       Follow up in 6 months     Marguerite Barrera MD   Internal Medicine   Primary Care

## 2023-02-10 PROCEDURE — 90471 IMMUNIZATION ADMIN: CPT | Mod: S$GLB,,, | Performed by: INTERNAL MEDICINE

## 2023-02-10 PROCEDURE — 90686 IIV4 VACC NO PRSV 0.5 ML IM: CPT | Mod: S$GLB,,, | Performed by: INTERNAL MEDICINE

## 2023-02-10 PROCEDURE — 90686 FLU VACCINE (QUAD) GREATER THAN OR EQUAL TO 3YO PRESERVATIVE FREE IM: ICD-10-PCS | Mod: S$GLB,,, | Performed by: INTERNAL MEDICINE

## 2023-02-10 PROCEDURE — 90471 FLU VACCINE (QUAD) GREATER THAN OR EQUAL TO 3YO PRESERVATIVE FREE IM: ICD-10-PCS | Mod: S$GLB,,, | Performed by: INTERNAL MEDICINE

## 2023-07-31 ENCOUNTER — LAB VISIT (OUTPATIENT)
Dept: LAB | Facility: HOSPITAL | Age: 58
End: 2023-07-31
Payer: COMMERCIAL

## 2023-07-31 DIAGNOSIS — Z12.5 ENCOUNTER FOR PROSTATE CANCER SCREENING: ICD-10-CM

## 2023-07-31 DIAGNOSIS — E78.5 HYPERLIPIDEMIA, UNSPECIFIED HYPERLIPIDEMIA TYPE: ICD-10-CM

## 2023-07-31 LAB
ALBUMIN SERPL BCP-MCNC: 4.1 G/DL (ref 3.5–5.2)
ALP SERPL-CCNC: 78 U/L (ref 55–135)
ALT SERPL W/O P-5'-P-CCNC: 51 U/L (ref 10–44)
ANION GAP SERPL CALC-SCNC: 8 MMOL/L (ref 8–16)
AST SERPL-CCNC: 26 U/L (ref 10–40)
BILIRUB SERPL-MCNC: 0.8 MG/DL (ref 0.1–1)
BUN SERPL-MCNC: 17 MG/DL (ref 6–20)
CALCIUM SERPL-MCNC: 9.2 MG/DL (ref 8.7–10.5)
CHLORIDE SERPL-SCNC: 107 MMOL/L (ref 95–110)
CHOLEST SERPL-MCNC: 227 MG/DL (ref 120–199)
CHOLEST/HDLC SERPL: 6.3 {RATIO} (ref 2–5)
CO2 SERPL-SCNC: 24 MMOL/L (ref 23–29)
COMPLEXED PSA SERPL-MCNC: 0.44 NG/ML (ref 0–4)
CREAT SERPL-MCNC: 1.2 MG/DL (ref 0.5–1.4)
EST. GFR  (NO RACE VARIABLE): >60 ML/MIN/1.73 M^2
GLUCOSE SERPL-MCNC: 109 MG/DL (ref 70–110)
HDLC SERPL-MCNC: 36 MG/DL (ref 40–75)
HDLC SERPL: 15.9 % (ref 20–50)
LDLC SERPL CALC-MCNC: ABNORMAL MG/DL (ref 63–159)
NONHDLC SERPL-MCNC: 191 MG/DL
POTASSIUM SERPL-SCNC: 4.1 MMOL/L (ref 3.5–5.1)
PROT SERPL-MCNC: 7.2 G/DL (ref 6–8.4)
SODIUM SERPL-SCNC: 139 MMOL/L (ref 136–145)
TRIGL SERPL-MCNC: 490 MG/DL (ref 30–150)
TSH SERPL DL<=0.005 MIU/L-ACNC: 1.52 UIU/ML (ref 0.4–4)

## 2023-07-31 PROCEDURE — 84153 ASSAY OF PSA TOTAL: CPT | Performed by: INTERNAL MEDICINE

## 2023-07-31 PROCEDURE — 80053 COMPREHEN METABOLIC PANEL: CPT | Performed by: INTERNAL MEDICINE

## 2023-07-31 PROCEDURE — 36415 COLL VENOUS BLD VENIPUNCTURE: CPT | Performed by: INTERNAL MEDICINE

## 2023-07-31 PROCEDURE — 84443 ASSAY THYROID STIM HORMONE: CPT | Performed by: INTERNAL MEDICINE

## 2023-07-31 PROCEDURE — 80061 LIPID PANEL: CPT | Performed by: INTERNAL MEDICINE

## 2023-08-08 ENCOUNTER — OFFICE VISIT (OUTPATIENT)
Dept: INTERNAL MEDICINE | Facility: CLINIC | Age: 58
End: 2023-08-08
Payer: COMMERCIAL

## 2023-08-08 VITALS
HEIGHT: 72 IN | WEIGHT: 207.88 LBS | DIASTOLIC BLOOD PRESSURE: 70 MMHG | OXYGEN SATURATION: 97 % | HEART RATE: 110 BPM | BODY MASS INDEX: 28.16 KG/M2 | SYSTOLIC BLOOD PRESSURE: 128 MMHG

## 2023-08-08 DIAGNOSIS — F41.1 GENERALIZED ANXIETY DISORDER: ICD-10-CM

## 2023-08-08 DIAGNOSIS — E78.1 HYPERTRIGLYCERIDEMIA: Primary | ICD-10-CM

## 2023-08-08 PROCEDURE — 1159F MED LIST DOCD IN RCRD: CPT | Mod: CPTII,S$GLB,, | Performed by: INTERNAL MEDICINE

## 2023-08-08 PROCEDURE — 3074F PR MOST RECENT SYSTOLIC BLOOD PRESSURE < 130 MM HG: ICD-10-PCS | Mod: CPTII,S$GLB,, | Performed by: INTERNAL MEDICINE

## 2023-08-08 PROCEDURE — 1159F PR MEDICATION LIST DOCUMENTED IN MEDICAL RECORD: ICD-10-PCS | Mod: CPTII,S$GLB,, | Performed by: INTERNAL MEDICINE

## 2023-08-08 PROCEDURE — 99214 PR OFFICE/OUTPT VISIT, EST, LEVL IV, 30-39 MIN: ICD-10-PCS | Mod: S$GLB,,, | Performed by: INTERNAL MEDICINE

## 2023-08-08 PROCEDURE — 99214 OFFICE O/P EST MOD 30 MIN: CPT | Mod: S$GLB,,, | Performed by: INTERNAL MEDICINE

## 2023-08-08 PROCEDURE — 3008F BODY MASS INDEX DOCD: CPT | Mod: CPTII,S$GLB,, | Performed by: INTERNAL MEDICINE

## 2023-08-08 PROCEDURE — 3074F SYST BP LT 130 MM HG: CPT | Mod: CPTII,S$GLB,, | Performed by: INTERNAL MEDICINE

## 2023-08-08 PROCEDURE — 3078F DIAST BP <80 MM HG: CPT | Mod: CPTII,S$GLB,, | Performed by: INTERNAL MEDICINE

## 2023-08-08 PROCEDURE — 3078F PR MOST RECENT DIASTOLIC BLOOD PRESSURE < 80 MM HG: ICD-10-PCS | Mod: CPTII,S$GLB,, | Performed by: INTERNAL MEDICINE

## 2023-08-08 PROCEDURE — 99999 PR PBB SHADOW E&M-EST. PATIENT-LVL III: ICD-10-PCS | Mod: PBBFAC,,, | Performed by: INTERNAL MEDICINE

## 2023-08-08 PROCEDURE — 3008F PR BODY MASS INDEX (BMI) DOCUMENTED: ICD-10-PCS | Mod: CPTII,S$GLB,, | Performed by: INTERNAL MEDICINE

## 2023-08-08 PROCEDURE — 99999 PR PBB SHADOW E&M-EST. PATIENT-LVL III: CPT | Mod: PBBFAC,,, | Performed by: INTERNAL MEDICINE

## 2023-08-08 NOTE — PATIENT INSTRUCTIONS
Wean off mirtazapine as follows:    Week one: take half tablet nightly.   Week two: take half tablet every other ngiht   Week three: take half tablet every 3 nights.   Week four: stop medication     Repeat labs in 6 weeks.

## 2023-08-08 NOTE — PROGRESS NOTES
Subjective:       Patient ID: Simeon Bishop is a 57 y.o. male.    Chief Complaint: Follow-up      During previous visit we restarted mirtazapine. Repeat lipids recently checked showed elevated triglycerides to 490.     Otherwise feeling well with no new complaints.     PMHX:  HLD: as above   Generalized Anxiety disorder: we restarted mirtazapine last visit and he doing much better. Had been having panic attacks prior to restarting and had cardiac workup that was negative.         Surgical and social hx reviewed.      Health Maintenance:  Colon Cancer Screening: Just recently had colonoscopy January 2023 with benign polyps removed. Due in 2028.   HIV: negative 2022   Hep C: done 10 2016 was negative   Lipids: Order today   Vaccines: Tdap 3/2022, Flu due in 9/2022, Covid x2 getting booster today       Works in [employer]. . Former smoker.          Follow-up  Pertinent negatives include no abdominal pain, arthralgias, chest pain, chills, coughing, diaphoresis, fatigue, fever, headaches, nausea, sore throat, vomiting or weakness.     Review of Systems   Constitutional:  Negative for chills, diaphoresis, fatigue and fever.   HENT:  Negative for rhinorrhea, sneezing and sore throat.    Respiratory:  Negative for cough, chest tightness, shortness of breath and wheezing.    Cardiovascular:  Negative for chest pain, palpitations and leg swelling.   Gastrointestinal:  Negative for abdominal pain, blood in stool, diarrhea, nausea and vomiting.   Musculoskeletal:  Negative for arthralgias and back pain.   Neurological:  Negative for dizziness, weakness, light-headedness and headaches.   Psychiatric/Behavioral:  Negative for agitation and behavioral problems.            Past Medical History:   Diagnosis Date    Disorder of kidney and ureter     Iritis     x 2     Kidney stones      Past Surgical History:   Procedure Laterality Date    COLONOSCOPY N/A 11/15/2016    Procedure: COLONOSCOPY-Miralax split prep;   Surgeon: Carlo Ward MD;  Location: [facility] ENDO;  Service: Endoscopy;  Laterality: N/A;    COLONOSCOPY N/A 1/10/2023    Procedure: COLONOSCOPY;  Surgeon: Guru Barker MD;  Location: [facility] (4TH FLR);  Service: Endoscopy;  Laterality: N/A;  instructions sent to [facility]  pre call done    EYE SURGERY        Patient Active Problem List   Diagnosis    Hyperlipidemia    Family history of colon cancer    Colon polyp, hyperplastic    Trapezius strain        Objective:      Physical Exam  Constitutional:       Appearance: Normal appearance.   HENT:      Head: Normocephalic and atraumatic.   Cardiovascular:      Rate and Rhythm: Normal rate and regular rhythm.      Heart sounds: Normal heart sounds.   Pulmonary:      Effort: Pulmonary effort is normal.      Breath sounds: Normal breath sounds. No stridor. No wheezing or rales.   Abdominal:      General: Abdomen is flat.      Palpations: Abdomen is soft. There is no mass.      Tenderness: There is no abdominal tenderness.   Skin:     General: Skin is warm and dry.   Neurological:      Mental Status: He is alert and oriented to person, place, and time.   Psychiatric:         Mood and Affect: Mood normal.         Assessment:       Problem List Items Addressed This Visit    None  Visit Diagnoses       Hypertriglyceridemia    -  Primary    Relevant Orders    Lipid panel    LIPASE    COMPREHENSIVE METABOLIC PANEL    Generalized anxiety disorder                Plan:         Simeon was seen today for follow-up.    Diagnoses and all orders for this visit:    Hypertriglyceridemia  Likely medication induced from mirtazapine   Plan to taper of mirtazapine and recheck lipids.   Will also work on cutting back on alcohol and red meat and increasing omega 3 through diet.     Generalized anxiety disorder  Taper off mirtazapine and monitor symptoms. If anxiety symptoms recur consider starting SSRI.      Follow up with repeat lipids in 6 weeks.            Marguerite Barrera MD   Internal Medicine   Primary Care

## 2023-09-01 ENCOUNTER — LAB VISIT (OUTPATIENT)
Dept: LAB | Facility: HOSPITAL | Age: 58
End: 2023-09-01
Payer: COMMERCIAL

## 2023-09-01 DIAGNOSIS — E78.1 HYPERTRIGLYCERIDEMIA: ICD-10-CM

## 2023-09-01 LAB
ALBUMIN SERPL BCP-MCNC: 4.2 G/DL (ref 3.5–5.2)
ALP SERPL-CCNC: 75 U/L (ref 55–135)
ALT SERPL W/O P-5'-P-CCNC: 44 U/L (ref 10–44)
ANION GAP SERPL CALC-SCNC: 9 MMOL/L (ref 8–16)
AST SERPL-CCNC: 19 U/L (ref 10–40)
BILIRUB SERPL-MCNC: 0.8 MG/DL (ref 0.1–1)
BUN SERPL-MCNC: 15 MG/DL (ref 6–20)
CALCIUM SERPL-MCNC: 9.2 MG/DL (ref 8.7–10.5)
CHLORIDE SERPL-SCNC: 105 MMOL/L (ref 95–110)
CHOLEST SERPL-MCNC: 201 MG/DL (ref 120–199)
CHOLEST/HDLC SERPL: 5.6 {RATIO} (ref 2–5)
CO2 SERPL-SCNC: 24 MMOL/L (ref 23–29)
CREAT SERPL-MCNC: 1.3 MG/DL (ref 0.5–1.4)
EST. GFR  (NO RACE VARIABLE): >60 ML/MIN/1.73 M^2
GLUCOSE SERPL-MCNC: 109 MG/DL (ref 70–110)
HDLC SERPL-MCNC: 36 MG/DL (ref 40–75)
HDLC SERPL: 17.9 % (ref 20–50)
LDLC SERPL CALC-MCNC: 100.6 MG/DL (ref 63–159)
LIPASE SERPL-CCNC: 23 U/L (ref 4–60)
NONHDLC SERPL-MCNC: 165 MG/DL
POTASSIUM SERPL-SCNC: 4.2 MMOL/L (ref 3.5–5.1)
PROT SERPL-MCNC: 6.9 G/DL (ref 6–8.4)
SODIUM SERPL-SCNC: 138 MMOL/L (ref 136–145)
TRIGL SERPL-MCNC: 322 MG/DL (ref 30–150)

## 2023-09-01 PROCEDURE — 80053 COMPREHEN METABOLIC PANEL: CPT | Performed by: INTERNAL MEDICINE

## 2023-09-01 PROCEDURE — 80061 LIPID PANEL: CPT | Performed by: INTERNAL MEDICINE

## 2023-09-01 PROCEDURE — 83690 ASSAY OF LIPASE: CPT | Performed by: INTERNAL MEDICINE

## 2023-09-01 PROCEDURE — 36415 COLL VENOUS BLD VENIPUNCTURE: CPT | Performed by: INTERNAL MEDICINE

## 2023-09-06 ENCOUNTER — OFFICE VISIT (OUTPATIENT)
Dept: INTERNAL MEDICINE | Facility: CLINIC | Age: 58
End: 2023-09-06
Payer: COMMERCIAL

## 2023-09-06 VITALS
HEART RATE: 101 BPM | HEIGHT: 65 IN | OXYGEN SATURATION: 97 % | WEIGHT: 153.25 LBS | BODY MASS INDEX: 25.53 KG/M2 | DIASTOLIC BLOOD PRESSURE: 72 MMHG | SYSTOLIC BLOOD PRESSURE: 130 MMHG

## 2023-09-06 DIAGNOSIS — F41.1 GENERALIZED ANXIETY DISORDER: ICD-10-CM

## 2023-09-06 DIAGNOSIS — E78.1 HYPERTRIGLYCERIDEMIA: Primary | ICD-10-CM

## 2023-09-06 PROCEDURE — 99214 OFFICE O/P EST MOD 30 MIN: CPT | Mod: S$GLB,,, | Performed by: INTERNAL MEDICINE

## 2023-09-06 PROCEDURE — 99214 PR OFFICE/OUTPT VISIT, EST, LEVL IV, 30-39 MIN: ICD-10-PCS | Mod: S$GLB,,, | Performed by: INTERNAL MEDICINE

## 2023-09-06 PROCEDURE — 3008F BODY MASS INDEX DOCD: CPT | Mod: CPTII,S$GLB,, | Performed by: INTERNAL MEDICINE

## 2023-09-06 PROCEDURE — 3008F PR BODY MASS INDEX (BMI) DOCUMENTED: ICD-10-PCS | Mod: CPTII,S$GLB,, | Performed by: INTERNAL MEDICINE

## 2023-09-06 PROCEDURE — 99999 PR PBB SHADOW E&M-EST. PATIENT-LVL III: ICD-10-PCS | Mod: PBBFAC,,, | Performed by: INTERNAL MEDICINE

## 2023-09-06 PROCEDURE — 99999 PR PBB SHADOW E&M-EST. PATIENT-LVL III: CPT | Mod: PBBFAC,,, | Performed by: INTERNAL MEDICINE

## 2023-09-06 PROCEDURE — 3075F SYST BP GE 130 - 139MM HG: CPT | Mod: CPTII,S$GLB,, | Performed by: INTERNAL MEDICINE

## 2023-09-06 PROCEDURE — 3078F DIAST BP <80 MM HG: CPT | Mod: CPTII,S$GLB,, | Performed by: INTERNAL MEDICINE

## 2023-09-06 PROCEDURE — 3075F PR MOST RECENT SYSTOLIC BLOOD PRESS GE 130-139MM HG: ICD-10-PCS | Mod: CPTII,S$GLB,, | Performed by: INTERNAL MEDICINE

## 2023-09-06 PROCEDURE — 3078F PR MOST RECENT DIASTOLIC BLOOD PRESSURE < 80 MM HG: ICD-10-PCS | Mod: CPTII,S$GLB,, | Performed by: INTERNAL MEDICINE

## 2023-09-06 RX ORDER — ROSUVASTATIN CALCIUM 5 MG/1
5 TABLET, COATED ORAL DAILY
Qty: 90 TABLET | Refills: 3 | Status: SHIPPED | OUTPATIENT
Start: 2023-09-06 | End: 2023-12-20

## 2023-09-06 NOTE — PROGRESS NOTES
Subjective:       Patient ID: Simeon Bishop is a 57 y.o. male.    Chief Complaint: Follow-up    During previous visit we restarted mirtazapine. Repeat lipids recently checked showed elevated triglycerides to 490. We then stopped mirtazapine and his triglycerides have improved but are still elevated to 300.      Otherwise feeling well with no new complaints.      PMHX:  HLD: as above   Generalized Anxiety disorder: has been on mirtazapine in the past but caused his triglycerides to go up significantly. Has now weaned of mirtazapine and is doing well. Had one panic attack 2 weeks ago but none since.          Surgical and social hx reviewed.      Health Maintenance:  Colon Cancer Screening: Just recently had colonoscopy January 2023 with benign polyps removed. Due in 2028.   HIV: negative 2022   Hep C: done 10 2016 was negative   Lipids: Order today   Vaccines: needs shingles. Otherwise up to date.      Works in [employer]. . Former smoker.          Follow-up  Pertinent negatives include no abdominal pain, arthralgias, chest pain, chills, coughing, diaphoresis, fatigue, fever, headaches, nausea, sore throat, vomiting or weakness.     Review of Systems   Constitutional:  Negative for chills, diaphoresis, fatigue and fever.   HENT:  Negative for rhinorrhea, sneezing and sore throat.    Respiratory:  Negative for cough, chest tightness, shortness of breath and wheezing.    Cardiovascular:  Negative for chest pain, palpitations and leg swelling.   Gastrointestinal:  Negative for abdominal pain, blood in stool, diarrhea, nausea and vomiting.   Musculoskeletal:  Negative for arthralgias and back pain.   Neurological:  Negative for dizziness, weakness, light-headedness and headaches.   Psychiatric/Behavioral:  Negative for agitation and behavioral problems.            Past Medical History:   Diagnosis Date    Disorder of kidney and ureter     Iritis     x 2     Kidney stones      Past Surgical History:    Procedure Laterality Date    COLONOSCOPY N/A 11/15/2016    Procedure: COLONOSCOPY-Miralax split prep;  Surgeon: Carlo Ward MD;  Location: [facility];  Service: Endoscopy;  Laterality: N/A;    COLONOSCOPY N/A 1/10/2023    Procedure: COLONOSCOPY;  Surgeon: Guru Barker MD;  Location: [facility] (4TH FLR);  Service: Endoscopy;  Laterality: N/A;  instructions sent to [facility]  pre call done    EYE SURGERY        Patient Active Problem List   Diagnosis    Hyperlipidemia    Family history of colon cancer    Colon polyp, hyperplastic    Trapezius strain        Objective:      Physical Exam  Constitutional:       Appearance: Normal appearance.   HENT:      Head: Normocephalic and atraumatic.   Cardiovascular:      Rate and Rhythm: Normal rate and regular rhythm.      Heart sounds: Normal heart sounds.   Pulmonary:      Effort: Pulmonary effort is normal.      Breath sounds: Normal breath sounds. No stridor. No wheezing or rales.   Abdominal:      General: Abdomen is flat.      Palpations: Abdomen is soft. There is no mass.      Tenderness: There is no abdominal tenderness.   Skin:     General: Skin is warm and dry.   Neurological:      Mental Status: He is alert and oriented to person, place, and time.   Psychiatric:         Mood and Affect: Mood normal.         Assessment:       Problem List Items Addressed This Visit    None  Visit Diagnoses       Hypertriglyceridemia    -  Primary    Relevant Orders    LIPID PANEL    COMPREHENSIVE METABOLIC PANEL    Generalized anxiety disorder                Plan:         Simeon was seen today for follow-up.    Diagnoses and all orders for this visit:    Hypertriglyceridemia  -     LIPID PANEL; Future  -     COMPREHENSIVE METABOLIC PANEL; Future  -     rosuvastatin (CRESTOR) 5 MG tablet; Take 1 tablet (5 mg total) by mouth once daily.    During previous visit we restarted mirtazapine. Repeat lipids recently checked showed elevated triglycerides to  490. We then stopped mirtazapine and his triglycerides have improved but are still elevated to 300. We will start rosuvastatin and repeat lipids in 2 moths.     Generalized anxiety disorder  Had one panic attack 2 weeks ago but has felt well since.   Will monitor for now and restart a different class of anxiolytic if necessary.     Follow up in 2 months           Marguerite Barrera MD   Internal Medicine   Primary Care

## 2023-12-07 ENCOUNTER — LAB VISIT (OUTPATIENT)
Dept: LAB | Facility: HOSPITAL | Age: 58
End: 2023-12-07
Payer: COMMERCIAL

## 2023-12-07 DIAGNOSIS — E78.1 HYPERTRIGLYCERIDEMIA: ICD-10-CM

## 2023-12-07 LAB
ALBUMIN SERPL BCP-MCNC: 4.2 G/DL (ref 3.5–5.2)
ALP SERPL-CCNC: 70 U/L (ref 55–135)
ALT SERPL W/O P-5'-P-CCNC: 42 U/L (ref 10–44)
ANION GAP SERPL CALC-SCNC: 11 MMOL/L (ref 8–16)
AST SERPL-CCNC: 28 U/L (ref 10–40)
BILIRUB SERPL-MCNC: 0.9 MG/DL (ref 0.1–1)
BUN SERPL-MCNC: 15 MG/DL (ref 6–20)
CALCIUM SERPL-MCNC: 9.2 MG/DL (ref 8.7–10.5)
CHLORIDE SERPL-SCNC: 108 MMOL/L (ref 95–110)
CHOLEST SERPL-MCNC: 192 MG/DL (ref 120–199)
CHOLEST/HDLC SERPL: 5.2 {RATIO} (ref 2–5)
CO2 SERPL-SCNC: 20 MMOL/L (ref 23–29)
CREAT SERPL-MCNC: 1.2 MG/DL (ref 0.5–1.4)
EST. GFR  (NO RACE VARIABLE): >60 ML/MIN/1.73 M^2
GLUCOSE SERPL-MCNC: 90 MG/DL (ref 70–110)
HDLC SERPL-MCNC: 37 MG/DL (ref 40–75)
HDLC SERPL: 19.3 % (ref 20–50)
LDLC SERPL CALC-MCNC: ABNORMAL MG/DL (ref 63–159)
NONHDLC SERPL-MCNC: 155 MG/DL
POTASSIUM SERPL-SCNC: 4.5 MMOL/L (ref 3.5–5.1)
PROT SERPL-MCNC: 7.2 G/DL (ref 6–8.4)
SODIUM SERPL-SCNC: 139 MMOL/L (ref 136–145)
TRIGL SERPL-MCNC: 436 MG/DL (ref 30–150)

## 2023-12-07 PROCEDURE — 80061 LIPID PANEL: CPT | Performed by: INTERNAL MEDICINE

## 2023-12-07 PROCEDURE — 80053 COMPREHEN METABOLIC PANEL: CPT | Performed by: INTERNAL MEDICINE

## 2023-12-07 PROCEDURE — 36415 COLL VENOUS BLD VENIPUNCTURE: CPT | Performed by: INTERNAL MEDICINE

## 2023-12-20 DIAGNOSIS — E78.1 HYPERTRIGLYCERIDEMIA: Primary | ICD-10-CM

## 2023-12-20 RX ORDER — ROSUVASTATIN CALCIUM 10 MG/1
10 TABLET, COATED ORAL DAILY
Qty: 90 TABLET | Refills: 3 | Status: SHIPPED | OUTPATIENT
Start: 2023-12-20 | End: 2024-12-19

## 2023-12-21 ENCOUNTER — TELEPHONE (OUTPATIENT)
Dept: INTERNAL MEDICINE | Facility: CLINIC | Age: 58
End: 2023-12-21
Payer: COMMERCIAL

## 2023-12-21 NOTE — TELEPHONE ENCOUNTER
----- Message from Marguerite Barrera MD sent at 12/20/2023  4:35 PM CST -----  Please arrange for him to repeat the labs in 3 months.     He will increase to 10 mg of rosuvastatin and work diet and we will check again in 3 months.

## 2024-01-04 ENCOUNTER — TELEPHONE (OUTPATIENT)
Dept: INTERNAL MEDICINE | Facility: CLINIC | Age: 59
End: 2024-01-04
Payer: COMMERCIAL

## 2024-01-04 NOTE — TELEPHONE ENCOUNTER
----- Message from Jessenia Batres sent at 1/4/2024  3:40 PM CST -----  Contact: Simeon [phone]  Returning a phone call.    Who left a message for the patient:  Monique Lucio MA    Do they know what this is regarding:  yes (3 month lab)    Would they like a phone call back or a response via [facility]:  call back

## 2024-01-05 ENCOUNTER — TELEPHONE (OUTPATIENT)
Dept: INTERNAL MEDICINE | Facility: CLINIC | Age: 59
End: 2024-01-05
Payer: COMMERCIAL

## 2024-02-02 ENCOUNTER — PATIENT MESSAGE (OUTPATIENT)
Dept: INTERNAL MEDICINE | Facility: CLINIC | Age: 59
End: 2024-02-02
Payer: COMMERCIAL

## 2024-02-29 ENCOUNTER — LAB VISIT (OUTPATIENT)
Dept: LAB | Facility: HOSPITAL | Age: 59
End: 2024-02-29
Payer: COMMERCIAL

## 2024-02-29 DIAGNOSIS — E78.1 HYPERTRIGLYCERIDEMIA: ICD-10-CM

## 2024-02-29 LAB
CHOLEST SERPL-MCNC: 146 MG/DL (ref 120–199)
CHOLEST/HDLC SERPL: 2.9 {RATIO} (ref 2–5)
HDLC SERPL-MCNC: 51 MG/DL (ref 40–75)
HDLC SERPL: 34.9 % (ref 20–50)
LDLC SERPL CALC-MCNC: 79.2 MG/DL (ref 63–159)
NONHDLC SERPL-MCNC: 95 MG/DL
TRIGL SERPL-MCNC: 79 MG/DL (ref 30–150)

## 2024-02-29 PROCEDURE — 36415 COLL VENOUS BLD VENIPUNCTURE: CPT | Performed by: INTERNAL MEDICINE

## 2024-02-29 PROCEDURE — 80061 LIPID PANEL: CPT | Performed by: INTERNAL MEDICINE

## 2024-03-04 ENCOUNTER — OFFICE VISIT (OUTPATIENT)
Dept: INTERNAL MEDICINE | Facility: CLINIC | Age: 59
End: 2024-03-04
Payer: COMMERCIAL

## 2024-03-04 VITALS
OXYGEN SATURATION: 98 % | WEIGHT: 188.06 LBS | HEIGHT: 72 IN | HEART RATE: 99 BPM | BODY MASS INDEX: 25.47 KG/M2 | DIASTOLIC BLOOD PRESSURE: 78 MMHG | SYSTOLIC BLOOD PRESSURE: 131 MMHG

## 2024-03-04 DIAGNOSIS — E78.5 HYPERLIPIDEMIA, UNSPECIFIED HYPERLIPIDEMIA TYPE: ICD-10-CM

## 2024-03-04 DIAGNOSIS — E78.1 HYPERTRIGLYCERIDEMIA: Primary | ICD-10-CM

## 2024-03-04 DIAGNOSIS — F41.1 GENERALIZED ANXIETY DISORDER: ICD-10-CM

## 2024-03-04 PROCEDURE — 3078F DIAST BP <80 MM HG: CPT | Mod: CPTII,S$GLB,, | Performed by: INTERNAL MEDICINE

## 2024-03-04 PROCEDURE — 3008F BODY MASS INDEX DOCD: CPT | Mod: CPTII,S$GLB,, | Performed by: INTERNAL MEDICINE

## 2024-03-04 PROCEDURE — 3075F SYST BP GE 130 - 139MM HG: CPT | Mod: CPTII,S$GLB,, | Performed by: INTERNAL MEDICINE

## 2024-03-04 PROCEDURE — 99214 OFFICE O/P EST MOD 30 MIN: CPT | Mod: S$GLB,,, | Performed by: INTERNAL MEDICINE

## 2024-03-04 PROCEDURE — 99999 PR PBB SHADOW E&M-EST. PATIENT-LVL III: CPT | Mod: PBBFAC,,, | Performed by: INTERNAL MEDICINE

## 2024-03-04 NOTE — PROGRESS NOTES
Subjective:       Patient ID: Simeon Bishop is a 58 y.o. male.    Chief Complaint: Follow-up    During previous visit we increased rosuvastatin to 10 mg and recent lipid panel is now within normal limits.   Tolerating medication well. Has also been working hard on diet.     No other new complaints.      PMHX:  HLD: improved on recent labs with dietary changes and rosuvastatin 10 mg daily.     Generalized Anxiety disorder: has been on mirtazapine in the past but caused his triglycerides to go up significantly. Has now weaned of mirtazapine and is doing well. Had one panic attack 2 weeks ago but none since.  Doing well. No need for medication at this time.        Surgical and social hx reviewed.      Health Maintenance:  Colon Cancer Screening: Just recently had colonoscopy January 2023 with benign polyps removed. Due in 2028.   HIV: negative 2022   Hep C: done 10 2016 was negative   Lipids: Order today   Vaccines: needs shingles. Otherwise up to date.      Works in [employer]. . Former smoker.    Follow-up  Pertinent negatives include no abdominal pain, arthralgias, chest pain, chills, coughing, diaphoresis, fatigue, fever, headaches, nausea, sore throat, vomiting or weakness.     Review of Systems   Constitutional:  Negative for chills, diaphoresis, fatigue and fever.   HENT:  Negative for rhinorrhea, sneezing and sore throat.    Respiratory:  Negative for cough, chest tightness, shortness of breath and wheezing.    Cardiovascular:  Negative for chest pain, palpitations and leg swelling.   Gastrointestinal:  Negative for abdominal pain, blood in stool, diarrhea, nausea and vomiting.   Musculoskeletal:  Negative for arthralgias and back pain.   Neurological:  Negative for dizziness, weakness, light-headedness and headaches.   Psychiatric/Behavioral:  Negative for agitation and behavioral problems.            Past Medical History:   Diagnosis Date    Disorder of kidney and ureter     Iritis     x 2      Kidney stones      Past Surgical History:   Procedure Laterality Date    COLONOSCOPY N/A 11/15/2016    Procedure: COLONOSCOPY-Miralax split prep;  Surgeon: Carlo Ward MD;  Location: [facility];  Service: Endoscopy;  Laterality: N/A;    COLONOSCOPY N/A 1/10/2023    Procedure: COLONOSCOPY;  Surgeon: Guru Barker MD;  Location: [facility] (4TH FLR);  Service: Endoscopy;  Laterality: N/A;  instructions sent to [facility]  pre call done    EYE SURGERY        Patient Active Problem List   Diagnosis    Hyperlipidemia    Family history of colon cancer    Colon polyp, hyperplastic    Trapezius strain        Objective:      Physical Exam  Constitutional:       Appearance: Normal appearance.   HENT:      Head: Normocephalic and atraumatic.   Cardiovascular:      Rate and Rhythm: Normal rate and regular rhythm.      Heart sounds: Normal heart sounds.   Pulmonary:      Effort: Pulmonary effort is normal.      Breath sounds: Normal breath sounds. No stridor. No wheezing or rales.   Abdominal:      General: Abdomen is flat.      Palpations: Abdomen is soft. There is no mass.      Tenderness: There is no abdominal tenderness.   Skin:     General: Skin is warm and dry.   Neurological:      Mental Status: He is alert and oriented to person, place, and time.   Psychiatric:         Mood and Affect: Mood normal.         Assessment:       Problem List Items Addressed This Visit          Cardiac/Vascular    Hyperlipidemia     Other Visit Diagnoses       Hypertriglyceridemia    -  Primary    Generalized anxiety disorder                Plan:         Simeon was seen today for follow-up.    Diagnoses and all orders for this visit:    Hypertriglyceridemia  Hyperlipidemia, unspecified hyperlipidemia type  LDL and triglycerides are now within normal range with dietary changes and rosuvastatin 10 mg daily.   Continue medication.     Generalized anxiety disorder  Doing well off all meds. No need for medication  at this time.                    Marguerite Barrera MD   Internal Medicine   Primary Care

## 2024-06-10 ENCOUNTER — PATIENT MESSAGE (OUTPATIENT)
Dept: INTERNAL MEDICINE | Facility: CLINIC | Age: 59
End: 2024-06-10
Payer: COMMERCIAL

## 2024-11-11 ENCOUNTER — HOSPITAL ENCOUNTER (EMERGENCY)
Facility: HOSPITAL | Age: 59
Discharge: HOME | End: 2024-11-11
Attending: EMERGENCY MEDICINE
Payer: COMMERCIAL

## 2024-11-11 VITALS
RESPIRATION RATE: 16 BRPM | OXYGEN SATURATION: 99 % | WEIGHT: 196 LBS | TEMPERATURE: 98 F | HEART RATE: 86 BPM | BODY MASS INDEX: 26.55 KG/M2 | HEIGHT: 72 IN | DIASTOLIC BLOOD PRESSURE: 93 MMHG | SYSTOLIC BLOOD PRESSURE: 177 MMHG

## 2024-11-11 DIAGNOSIS — R73.9 ELEVATED RANDOM BLOOD GLUCOSE LEVEL: ICD-10-CM

## 2024-11-11 DIAGNOSIS — R10.9 ABDOMINAL PAIN: ICD-10-CM

## 2024-11-11 DIAGNOSIS — K29.70 GASTRITIS, PRESENCE OF BLEEDING UNSPECIFIED, UNSPECIFIED CHRONICITY, UNSPECIFIED GASTRITIS TYPE: ICD-10-CM

## 2024-11-11 DIAGNOSIS — N28.89 RIGHT RENAL MASS: Primary | ICD-10-CM

## 2024-11-11 LAB
ALBUMIN SERPL BCP-MCNC: 4.4 G/DL (ref 3.5–5.2)
ALP SERPL-CCNC: 62 U/L (ref 40–150)
ALT SERPL W/O P-5'-P-CCNC: 28 U/L (ref 10–44)
ANION GAP SERPL CALC-SCNC: 15 MMOL/L (ref 8–16)
AST SERPL-CCNC: 18 U/L (ref 10–40)
BACTERIA #/AREA URNS AUTO: ABNORMAL /HPF
BASOPHILS # BLD AUTO: 0.03 K/UL (ref 0–0.2)
BASOPHILS NFR BLD: 0.2 % (ref 0–1.9)
BILIRUB SERPL-MCNC: 1 MG/DL (ref 0.1–1)
BILIRUB UR QL STRIP: NEGATIVE
BUN SERPL-MCNC: 17 MG/DL (ref 8–23)
BUN SERPL-MCNC: 18 MG/DL (ref 6–30)
CALCIUM SERPL-MCNC: 9.8 MG/DL (ref 8.7–10.5)
CHLORIDE SERPL-SCNC: 103 MMOL/L (ref 95–110)
CHLORIDE SERPL-SCNC: 104 MMOL/L (ref 95–110)
CLARITY UR REFRACT.AUTO: CLEAR
CO2 SERPL-SCNC: 19 MMOL/L (ref 23–29)
COLOR UR AUTO: YELLOW
CREAT SERPL-MCNC: 1.2 MG/DL (ref 0.5–1.4)
CREAT SERPL-MCNC: 1.3 MG/DL (ref 0.5–1.4)
DIFFERENTIAL METHOD BLD: ABNORMAL
EOSINOPHIL # BLD AUTO: 0 K/UL (ref 0–0.5)
EOSINOPHIL NFR BLD: 0 % (ref 0–8)
ERYTHROCYTE [DISTWIDTH] IN BLOOD BY AUTOMATED COUNT: 11.9 % (ref 11.5–14.5)
EST. GFR  (NO RACE VARIABLE): >60 ML/MIN/1.73 M^2
ESTIMATED AVG GLUCOSE: 108 MG/DL (ref 68–131)
GLUCOSE SERPL-MCNC: 196 MG/DL (ref 70–110)
GLUCOSE SERPL-MCNC: 198 MG/DL (ref 70–110)
GLUCOSE UR QL STRIP: NEGATIVE
HBA1C MFR BLD: 5.4 % (ref 4–5.6)
HCT VFR BLD AUTO: 45.1 % (ref 40–54)
HCT VFR BLD CALC: 46 %PCV (ref 36–54)
HCV AB SERPL QL IA: NORMAL
HGB BLD-MCNC: 15.7 G/DL (ref 14–18)
HGB UR QL STRIP: NEGATIVE
HIV 1+2 AB+HIV1 P24 AG SERPL QL IA: NORMAL
HYALINE CASTS UR QL AUTO: 4 /LPF
IMM GRANULOCYTES # BLD AUTO: 0.05 K/UL (ref 0–0.04)
IMM GRANULOCYTES NFR BLD AUTO: 0.4 % (ref 0–0.5)
KETONES UR QL STRIP: ABNORMAL
LEUKOCYTE ESTERASE UR QL STRIP: NEGATIVE
LIPASE SERPL-CCNC: 15 U/L (ref 4–60)
LYMPHOCYTES # BLD AUTO: 0.7 K/UL (ref 1–4.8)
LYMPHOCYTES NFR BLD: 5.4 % (ref 18–48)
MCH RBC QN AUTO: 30.3 PG (ref 27–31)
MCHC RBC AUTO-ENTMCNC: 34.8 G/DL (ref 32–36)
MCV RBC AUTO: 87 FL (ref 82–98)
MICROSCOPIC COMMENT: ABNORMAL
MONOCYTES # BLD AUTO: 0.4 K/UL (ref 0.3–1)
MONOCYTES NFR BLD: 2.8 % (ref 4–15)
NEUTROPHILS # BLD AUTO: 11.5 K/UL (ref 1.8–7.7)
NEUTROPHILS NFR BLD: 91.2 % (ref 38–73)
NITRITE UR QL STRIP: NEGATIVE
NRBC BLD-RTO: 0 /100 WBC
OHS QRS DURATION: 102 MS
OHS QTC CALCULATION: 452 MS
PH UR STRIP: 7 [PH] (ref 5–8)
PLATELET # BLD AUTO: 265 K/UL (ref 150–450)
PMV BLD AUTO: 8.9 FL (ref 9.2–12.9)
POC IONIZED CALCIUM: 1.1 MMOL/L (ref 1.06–1.42)
POC TCO2 (MEASURED): 21 MMOL/L (ref 23–29)
POTASSIUM BLD-SCNC: 3.8 MMOL/L (ref 3.5–5.1)
POTASSIUM SERPL-SCNC: 3.8 MMOL/L (ref 3.5–5.1)
PROT SERPL-MCNC: 7.5 G/DL (ref 6–8.4)
PROT UR QL STRIP: ABNORMAL
RBC # BLD AUTO: 5.19 M/UL (ref 4.6–6.2)
RBC #/AREA URNS AUTO: 3 /HPF (ref 0–4)
SAMPLE: ABNORMAL
SODIUM BLD-SCNC: 140 MMOL/L (ref 136–145)
SODIUM SERPL-SCNC: 138 MMOL/L (ref 136–145)
SP GR UR STRIP: 1.02 (ref 1–1.03)
URN SPEC COLLECT METH UR: ABNORMAL
WBC # BLD AUTO: 12.56 K/UL (ref 3.9–12.7)
WBC #/AREA URNS AUTO: 1 /HPF (ref 0–5)

## 2024-11-11 PROCEDURE — 83690 ASSAY OF LIPASE: CPT | Performed by: NURSE PRACTITIONER

## 2024-11-11 PROCEDURE — 86803 HEPATITIS C AB TEST: CPT | Performed by: PHYSICIAN ASSISTANT

## 2024-11-11 PROCEDURE — 80047 BASIC METABLC PNL IONIZED CA: CPT

## 2024-11-11 PROCEDURE — 63600175 PHARM REV CODE 636 W HCPCS: Performed by: NURSE PRACTITIONER

## 2024-11-11 PROCEDURE — 93010 ELECTROCARDIOGRAM REPORT: CPT | Mod: ,,, | Performed by: INTERNAL MEDICINE

## 2024-11-11 PROCEDURE — 96374 THER/PROPH/DIAG INJ IV PUSH: CPT

## 2024-11-11 PROCEDURE — 87389 HIV-1 AG W/HIV-1&-2 AB AG IA: CPT | Performed by: PHYSICIAN ASSISTANT

## 2024-11-11 PROCEDURE — 82330 ASSAY OF CALCIUM: CPT | Mod: 91

## 2024-11-11 PROCEDURE — 85025 COMPLETE CBC W/AUTO DIFF WBC: CPT | Performed by: NURSE PRACTITIONER

## 2024-11-11 PROCEDURE — 99285 EMERGENCY DEPT VISIT HI MDM: CPT | Mod: 25

## 2024-11-11 PROCEDURE — 83036 HEMOGLOBIN GLYCOSYLATED A1C: CPT | Performed by: NURSE PRACTITIONER

## 2024-11-11 PROCEDURE — 81001 URINALYSIS AUTO W/SCOPE: CPT | Performed by: NURSE PRACTITIONER

## 2024-11-11 PROCEDURE — 93005 ELECTROCARDIOGRAM TRACING: CPT

## 2024-11-11 PROCEDURE — 25500020 PHARM REV CODE 255: Performed by: NURSE PRACTITIONER

## 2024-11-11 PROCEDURE — 25000003 PHARM REV CODE 250: Performed by: NURSE PRACTITIONER

## 2024-11-11 PROCEDURE — 80053 COMPREHEN METABOLIC PANEL: CPT | Performed by: NURSE PRACTITIONER

## 2024-11-11 RX ORDER — FAMOTIDINE 20 MG/1
20 TABLET, FILM COATED ORAL
Status: COMPLETED | OUTPATIENT
Start: 2024-11-11 | End: 2024-11-11

## 2024-11-11 RX ORDER — DICYCLOMINE HYDROCHLORIDE 10 MG/1
20 CAPSULE ORAL
Status: COMPLETED | OUTPATIENT
Start: 2024-11-11 | End: 2024-11-11

## 2024-11-11 RX ORDER — DICYCLOMINE HYDROCHLORIDE 20 MG/1
20 TABLET ORAL 2 TIMES DAILY
Qty: 20 TABLET | Refills: 0 | Status: SHIPPED | OUTPATIENT
Start: 2024-11-11 | End: 2024-12-11

## 2024-11-11 RX ORDER — FAMOTIDINE 20 MG/1
20 TABLET, FILM COATED ORAL 2 TIMES DAILY
Qty: 20 TABLET | Refills: 0 | Status: SHIPPED | OUTPATIENT
Start: 2024-11-11 | End: 2025-11-11

## 2024-11-11 RX ORDER — KETOROLAC TROMETHAMINE 30 MG/ML
10 INJECTION, SOLUTION INTRAMUSCULAR; INTRAVENOUS
Status: COMPLETED | OUTPATIENT
Start: 2024-11-11 | End: 2024-11-11

## 2024-11-11 RX ADMIN — KETOROLAC TROMETHAMINE 10 MG: 30 INJECTION, SOLUTION INTRAMUSCULAR; INTRAVENOUS at 07:11

## 2024-11-11 RX ADMIN — FAMOTIDINE 20 MG: 20 TABLET ORAL at 07:11

## 2024-11-11 RX ADMIN — DICYCLOMINE HYDROCHLORIDE 20 MG: 10 CAPSULE ORAL at 07:11

## 2024-11-11 RX ADMIN — IOHEXOL 100 ML: 350 INJECTION, SOLUTION INTRAVENOUS at 08:11

## 2024-11-11 NOTE — DISCHARGE INSTRUCTIONS
Urology will call you with an appt for follow up of the right renal mass.  He is also very important that you follow up with your primary care for further evaluation of your elevated blood sugar.  I would advised to have a bland diet for the next few days.

## 2024-11-11 NOTE — PROVIDER PROGRESS NOTES - EMERGENCY DEPT.
Encounter Date: 11/11/2024    ED Physician Progress Notes         EKG - STEMI Decision  Initial Reading: No STEMI present.  Response: No Action Needed.

## 2024-11-11 NOTE — ED NOTES
Patient identifiers verified and correct for Simeon Bishop  LOC: The patient is awake, alert and aware of environment with an appropriate affect, the patient is oriented x 3 and speaking appropriately.   APPEARANCE: Patient appears comfortable and in no acute distress, patient is clean and well groomed.  SKIN: The skin is warm and dry, color consistent with ethnicity, patient has normal skin turgor and moist mucus membranes, skin intact, no breakdown or bruising noted.   MUSCULOSKELETAL: Patient moving all extremities spontaneously, no swelling noted.  RESPIRATORY: Airway is open and patent, respirations are spontaneous, patient has a normal effort and rate, no accessory muscle use noted, O2 Sat 97% on room air.  CARDIAC: Patient has a normal rate and regular rhythm, no edema noted, capillary refill < 3 seconds.   GASTRO: Soft and non tender to palpation, no distention noted, Pt states bowel movements have been regular. Pt reports abdominal pain.  : Pt denies any pain or frequency with urination.  NEURO: Pt opens eyes spontaneously, behavior appropriate to situation, follows commands, facial expression symmetrical, bilateral hand grasp equal and even, purposeful motor response noted, normal sensation in all extremities when touched with a finger.

## 2024-11-11 NOTE — ED PROVIDER NOTES
Encounter Date: 11/11/2024       History     Chief Complaint   Patient presents with    Abdominal Pain    Back Pain     Started at mid night     58 y/o male with history of kidney stones which presents to the emergency room with generalized abdominal pain that began about midnight.  Patient states that he had indigestion all day yesterday and now he has a burning sensation to his abdomen.  He had 1 episode of vomiting this morning and 3 loose stools yesterday.  Patient denies any fevers.    The history is provided by the patient.     Review of patient's allergies indicates:  No Known Allergies  Past Medical History:   Diagnosis Date    Disorder of kidney and ureter     Iritis     x 2     Kidney stones      Past Surgical History:   Procedure Laterality Date    COLONOSCOPY N/A 11/15/2016    Procedure: COLONOSCOPY-Miralax split prep;  Surgeon: Carlo Ward MD;  Location: [facility] ENDO;  Service: Endoscopy;  Laterality: N/A;    COLONOSCOPY N/A 1/10/2023    Procedure: COLONOSCOPY;  Surgeon: Guru Barker MD;  [facility]  Service: Endoscopy;  Laterality: N/A;  instructions sent to [facility]  pre call done    EYE SURGERY       Family History   Problem Relation Name Age of Onset    Heart disease Mother      Colon cancer Father  70    Cancer Father      Liver cancer Father      Alcohol abuse Father      Prostate cancer Neg Hx      Heart attacks under age 50 Neg Hx       Social History     Tobacco Use    Smoking status: Former    Smokeless tobacco: Never   Substance Use Topics    Alcohol use: Yes     Alcohol/week: 2.0 standard drinks of alcohol     Types: 2 Cans of beer per week    Drug use: No     Comment: 1 week ago     Review of Systems   Constitutional:  Negative for fever.   HENT:  Negative for sore throat.    Respiratory:  Negative for shortness of breath.    Cardiovascular:  Negative for chest pain.   Gastrointestinal:  Positive for abdominal pain, diarrhea, nausea and  vomiting.   Genitourinary:  Negative for dysuria.   Musculoskeletal:  Negative for back pain.   Skin:  Negative for rash.   Neurological:  Negative for weakness.   Hematological:  Does not bruise/bleed easily.   All other systems reviewed and are negative.      Physical Exam     Initial Vitals [11/11/24 0702]   BP Pulse Resp Temp SpO2   (!) 195/89 67 20 98.2 °F (36.8 °C) 100 %      MAP       --         Physical Exam    Nursing note and vitals reviewed.  Constitutional: He appears well-developed and well-nourished.   HENT:   Head: Normocephalic and atraumatic.   Eyes: Conjunctivae and EOM are normal. Pupils are equal, round, and reactive to light.   Neck:   Normal range of motion.  Cardiovascular:  Normal rate, regular rhythm, normal heart sounds and intact distal pulses.     Exam reveals no gallop and no friction rub.       No murmur heard.  Pulmonary/Chest: Breath sounds normal. No respiratory distress. He has no wheezes. He has no rhonchi. He has no rales. He exhibits no tenderness.   Abdominal: Abdomen is soft. Bowel sounds are normal. He exhibits no distension and no mass. There is no abdominal tenderness.   Pt denies pain on exam There is no rebound and no guarding.   Musculoskeletal:         General: No tenderness or edema. Normal range of motion.      Cervical back: Normal range of motion.     Neurological: He is alert and oriented to person, place, and time. He has normal strength. GCS score is 15. GCS eye subscore is 4. GCS verbal subscore is 5. GCS motor subscore is 6.   Skin: Skin is warm. Capillary refill takes less than 2 seconds.       ED Course   Procedures  Labs Reviewed   CBC W/ AUTO DIFFERENTIAL - Abnormal       Result Value    WBC 12.56      RBC 5.19      Hemoglobin 15.7      Hematocrit 45.1      MCV 87      MCH 30.3      MCHC 34.8      RDW 11.9      Platelets 265      MPV 8.9 (*)     Immature Granulocytes 0.4      Gran # (ANC) 11.5 (*)     Immature Grans (Abs) 0.05 (*)     Lymph # 0.7 (*)      Mono # 0.4      Eos # 0.0      Baso # 0.03      nRBC 0      Gran % 91.2 (*)     Lymph % 5.4 (*)     Mono % 2.8 (*)     Eosinophil % 0.0      Basophil % 0.2      Differential Method Automated     COMPREHENSIVE METABOLIC PANEL - Abnormal    Sodium 138      Potassium 3.8      Chloride 104      CO2 19 (*)     Glucose 196 (*)     BUN 17      Creatinine 1.3      Calcium 9.8      Total Protein 7.5      Albumin 4.4      Total Bilirubin 1.0      Alkaline Phosphatase 62      AST 18      ALT 28      eGFR >60.0      Anion Gap 15     URINALYSIS, REFLEX TO URINE CULTURE - Abnormal    Specimen UA Urine, Clean Catch      Color, UA Yellow      Appearance, UA Clear      pH, UA 7.0      Specific Gravity, UA 1.025      Protein, UA 1+ (*)     Glucose, UA Negative      Ketones, UA 3+ (*)     Bilirubin (UA) Negative      Occult Blood UA Negative      Nitrite, UA Negative      Leukocytes, UA Negative      Narrative:     Specimen Source->Urine   URINALYSIS MICROSCOPIC - Abnormal    RBC, UA 3      WBC, UA 1      Bacteria Few (*)     Hyaline Casts, UA 4 (*)     Microscopic Comment SEE COMMENT      Narrative:     Specimen Source->Urine   ISTAT PROCEDURE - Abnormal    POC Glucose 198 (*)     POC BUN 18      POC Creatinine 1.2      POC Sodium 140      POC Potassium 3.8      POC Chloride 103      POC TCO2 (MEASURED) 21 (*)     POC Ionized Calcium 1.10      POC Hematocrit 46      Sample TEMO     HIV 1 / 2 ANTIBODY    HIV 1/2 Ag/Ab Non-reactive      Narrative:     Release to patient->Immediate   HEPATITIS C ANTIBODY    Hepatitis C Ab Non-reactive      Narrative:     Release to patient->Immediate   LIPASE    Lipase 15     HEMOGLOBIN A1C   HEMOGLOBIN A1C    Hemoglobin A1C 5.4      Estimated Avg Glucose 108      Narrative:     Add on GHGB per SKYLER Jensen on  11/11/2024  09:08 [id number]   ISTAT CHEM8     EKG Readings: (Independently Interpreted)   Initial Reading: No STEMI. Previous EKG: Compared with most recent EKG Previous EKG Date:  3/28/2020. Rhythm: Normal Sinus Rhythm. Heart Rate: 63. ST Segments: Normal ST Segments. T Waves: Normal. Clinical Impression: Normal Sinus Rhythm   Since last EKG he has a QT that is more prolonged at 442.  Previously was 380.       Imaging Results               CT Abdomen Pelvis With IV Contrast NO Oral Contrast (Final result)  Result time 11/11/24 09:32:40      Final result by Neetu Phillips MD (11/11/24 09:32:40)                   Impression:      1. Expansile mass of the right kidney measuring up to 4.1 cm, concerning for neoplastic process such as RCC.  Recommend urologic consult.  2. Submucosal edema of the stomach, possibly acute gastritis, to be correlated with clinical findings  3. Small hypodensities within the liver some of which are too small to characterize.  The largest is similar to prior noncontrast CT compatible with a cyst.  4. Additional findings discussed above.  5. This report was flagged in Epic as abnormal.    Electronically signed by resident: [place]  Date:    11/11/2024  Time:    08:39    Electronically signed by: Neetu Phillips MD  Date:    11/11/2024  Time:    09:32               Narrative:    EXAMINATION:  CT ABDOMEN PELVIS WITH IV CONTRAST    CLINICAL HISTORY:  Abdominal pain, acute, nonlocalized;    TECHNIQUE:  Axial images of the abdomen and pelvis were acquired  after the use of 100 cc [id number] IV contrast.  Coronal and sagittal reconstructions were also obtained    COMPARISON:  CT abdomen 12/11/2008    FINDINGS:  Heart: Normal in size. No pericardial effusion. No significant calcific coronary atherosclerosis.    Lungs: Visualized portions of the lungs are clear.    Liver: Normal in size and contour.  Hypodense parenchyma within the area of the falciform ligament, likely fatty infiltration.  Subcentimeter hypodensity about the hepatic dome, similar to prior CT 12/11/2008.  Additional scattered hypodensities are too small to characterize and not well visualized on previous  noncontrast imaging.    Gallbladder: No calcified gallstones.    Bile Ducts: No evidence of dilated ducts.    Pancreas: No mass or peripancreatic fat stranding.    Spleen: Normal size.  No focal lesions.  Likely tiny accessory splenule.    Stomach and duodenum: Significant submucosal edema of the stomach predominantly around the pylorus, which may be seen with gastritis.  Duodenum is unremarkable.    Adrenals: Unremarkable.    Kidneys/ Ureters: Normal in size and location. Normal enhancement. No hydronephrosis or nephrolithiasis. No ureteral dilatation.  Heterogenously enhancing, expansile mass within the lower pole of the right kidney measuring 4.1 x 3.9 cm, concerning for neoplastic process such as RCC.  The renal vein is unremarkable.  No surrounding lymphadenopathy.    Bladder: No evidence of wall thickening.    Reproductive organs: Prostate is unremarkable.    Bowel/Mesentery: Small bowel is normal in caliber with no evidence of obstruction. No evidence of inflammation or wall thickening.  Colon demonstrates no focal wall thickening.  Appendix is unremarkable.    Peritoneum: No intraperitoneal free air or fluid    Lymph nodes: Subcentimeter perigastric nodes.  Scattered para-aortic and pericaval subcentimeter nodes.  No enlarged nodes by CT criteria.    Vasculature: No aneurysm. No significant calcific atherosclerosis.    Abdominal wall:  Bilateral fat containing inguinal hernias.    Bones: No acute fracture. No suspicious osseous lesions.  Degenerative changes of the spine noting large anterior bridging osteophyte of L4-L5.                                       Medications   ketorolac injection 9.999 mg (9.999 mg Intravenous Given 11/11/24 0746)   dicyclomine capsule 20 mg (20 mg Oral Given 11/11/24 0747)   famotidine tablet 20 mg (20 mg Oral Given 11/11/24 0746)   iohexoL (OMNIPAQUE 350) injection 100 mL (100 mLs Intravenous Given 11/11/24 0818)     Medical Decision Making  Male which presents to the  emergency room with generalized abdominal pain and back pain that began on last night around midnight.  Labs are unremarkable except for elevated blood sugar.  Hemoglobin A1c was sent which was 5.8.  Patient advised that he will have to follow up with his PCP for further evaluation of his hemoglobin A1c and has elevated blood sugars.  He had an incidental finding of a 4.1 cm right renal mass.  Urology was consulted and stated that he can follow up with urology clinic as an outpatient.  Patient advised that he also had signs of gastritis on the CT and will be sent home with Pepcid and Bentyl.  Patient did have relief of pain with Toradol, Bentyl, and Pepcid in the ED. did not have any vomiting.  He has been advised to return with any new symptoms. Patient given strict return precautions and voiced understanding of all discharge instructions. Pt was stable at discharge.       Differential diagnosis: gastroenteritis, gastritis, ulcer, cholecystitis, gallstones, pancreatitis, ileus, small bowel obstruction, appendicitis, constipation    Problems Addressed:  Abdominal pain: acute illness or injury  Elevated random blood glucose level: acute illness or injury  Gastritis, presence of bleeding unspecified, unspecified chronicity, unspecified gastritis type: acute illness or injury  Right renal mass: acute illness or injury    Amount and/or Complexity of Data Reviewed  Labs: ordered. Decision-making details documented in ED Course.  Radiology: ordered.    Risk  Prescription drug management.               ED Course as of 11/11/24 1102   Mon Nov 11, 2024   0713 BP(!): 195/89 [AT]   0713 Temp: 98.2 °F (36.8 °C) [AT]   0713 Temp Source: Oral [AT]   0713 Pulse: 67 [AT]   0713 Resp: 20 [AT]   0713 SpO2: 100 % [AT]   0842 Lipase: 15 [AT]   0843 POC Glucose(!): 198 [AT]   0848 Patient feeling better after receiving Toradol, Bentyl, and Pepcid. [AT]   0925 Hemoglobin A1C External: 5.4 [AT]      ED Course User Index  [AT] Irma  SKYLER Borges                           Clinical Impression:  Final diagnoses:  [R10.9] Abdominal pain  [N28.89] Right renal mass (Primary)  [K29.70] Gastritis, presence of bleeding unspecified, unspecified chronicity, unspecified gastritis type  [R73.9] Elevated random blood glucose level          ED Disposition Condition    Discharge Stable          ED Prescriptions       Medication Sig Dispense Start Date End Date Auth. Provider    famotidine (PEPCID) 20 MG tablet Take 1 tablet (20 mg total) by mouth 2 (two) times daily. 20 tablet 11/11/2024 11/11/2025 Teri Solis FNP    dicyclomine (BENTYL) 20 mg tablet Take 1 tablet (20 mg total) by mouth 2 (two) times daily. 20 tablet 11/11/2024 12/11/2024 Teri Solis FNP          Follow-up Information       Follow up With Specialties Details Why Contact Info    Marguerite Barrera MD Internal Medicine Schedule an appointment as soon as possible for a visit today  [address]  [place] [address]  [phone]               Teri Solis FNP  11/11/24 4655

## 2024-11-22 ENCOUNTER — OFFICE VISIT (OUTPATIENT)
Dept: INTERNAL MEDICINE | Facility: CLINIC | Age: 59
End: 2024-11-22
Payer: COMMERCIAL

## 2024-11-22 VITALS
HEIGHT: 72 IN | OXYGEN SATURATION: 98 % | WEIGHT: 189.63 LBS | DIASTOLIC BLOOD PRESSURE: 86 MMHG | BODY MASS INDEX: 25.68 KG/M2 | SYSTOLIC BLOOD PRESSURE: 139 MMHG | HEART RATE: 81 BPM

## 2024-11-22 DIAGNOSIS — N28.89 RENAL MASS: Primary | ICD-10-CM

## 2024-11-22 DIAGNOSIS — E78.5 HYPERLIPIDEMIA, UNSPECIFIED HYPERLIPIDEMIA TYPE: ICD-10-CM

## 2024-11-22 PROCEDURE — 99999 PR PBB SHADOW E&M-EST. PATIENT-LVL IV: CPT | Mod: PBBFAC,,, | Performed by: INTERNAL MEDICINE

## 2024-11-22 NOTE — PROGRESS NOTES
"Subjective:       Patient ID: Simeon Bishop is a 59 y.o. male.    Chief Complaint: Follow-up    Presents for ER follow up.    Presented to ER on 11/11 after he was experiencing generalized abdominal pain.  He has been having some indigestion and acid reflux symptoms that would not subside with over-the-counter medications.  He also had an episode of vomiting.  He had CT abdomen pelvis with contrast done as workup for this which showed edema of the stomach related to acute gastroenteritis.  He was treated with Pepcid and Bentyl and his symptoms of indigestion have resolved today.    CT scan also showed an incidental finding of a "Expansile mass of the right kidney measuring up to 4.1 cm, concerning for neoplastic process such as RCC ."  He denies any recent flank pain or hematuria.  He denies any recent fatigue, weight loss, lack of appetite or night sweats.       PMHX:  HLD: improved on recent labs with dietary changes and rosuvastatin 10 mg daily.     Generalized Anxiety disorder: has been on mirtazapine in the past but caused his triglycerides to go up significantly. Has now weaned of mirtazapine and is doing well. Had one panic attack 2 weeks ago but none since.  Doing well. No need for medication at this time.        Surgical and social hx reviewed.      Health Maintenance:  Colon Cancer Screening: Just recently had colonoscopy January 2023 with benign polyps removed. Due in 2028.   HIV: negative 2022   Hep C: done 10 2016 was negative   Lipids: Order today   Vaccines: needs shingles. Otherwise up to date.      Works in [employer]. . Former smoker.    Follow-up  Pertinent negatives include no abdominal pain, arthralgias, chest pain, chills, coughing, diaphoresis, fatigue, fever, headaches, nausea, sore throat, vomiting or weakness.     Review of Systems   Constitutional:  Negative for chills, diaphoresis, fatigue and fever.   HENT:  Negative for rhinorrhea, sneezing and sore throat.  "   Respiratory:  Negative for cough, chest tightness, shortness of breath and wheezing.    Cardiovascular:  Negative for chest pain, palpitations and leg swelling.   Gastrointestinal:  Negative for abdominal pain, blood in stool, diarrhea, nausea and vomiting.   Musculoskeletal:  Negative for arthralgias and back pain.   Neurological:  Negative for dizziness, weakness, light-headedness and headaches.   Psychiatric/Behavioral:  Negative for agitation and behavioral problems.            Past Medical History:   Diagnosis Date    Disorder of kidney and ureter     Iritis     x 2     Kidney stones      Past Surgical History:   Procedure Laterality Date    COLONOSCOPY N/A 11/15/2016    Procedure: COLONOSCOPY-Miralax split prep;  Surgeon: Carlo Ward MD;  Location: [facility] ENDO;  Service: Endoscopy;  Laterality: N/A;    COLONOSCOPY N/A 1/10/2023    Procedure: COLONOSCOPY;  Surgeon: Guru Barker MD;  Location: [facility] ENDO (4TH FLR);  Service: Endoscopy;  Laterality: N/A;  instructions sent to [facility]  supr  pre call done    EYE SURGERY        Patient Active Problem List   Diagnosis    Hyperlipidemia    Family history of colon cancer    Colon polyp, hyperplastic    Trapezius strain        Objective:      Physical Exam  Constitutional:       Appearance: Normal appearance.   HENT:      Head: Normocephalic and atraumatic.   Cardiovascular:      Rate and Rhythm: Normal rate and regular rhythm.      Heart sounds: Normal heart sounds.   Pulmonary:      Effort: Pulmonary effort is normal.      Breath sounds: Normal breath sounds. No stridor. No wheezing or rales.   Abdominal:      General: Abdomen is flat.      Palpations: Abdomen is soft. There is no mass.      Tenderness: There is no abdominal tenderness.   Skin:     General: Skin is warm and dry.   Neurological:      Mental Status: He is alert and oriented to person, place, and time.   Psychiatric:         Mood and Affect: Mood normal.        "  Assessment:       Problem List Items Addressed This Visit          Cardiac/Vascular    Hyperlipidemia    Relevant Orders    Comprehensive Metabolic Panel    CBC Auto Differential    Lipid Panel    Hemoglobin A1C    TSH     Other Visit Diagnoses       Renal mass    -  Primary    Relevant Orders    Ambulatory referral/consult to Urology            Plan:         Simeon Arana" was seen today for follow-up.    Diagnoses and all orders for this visit:    Renal mass  -     Ambulatory referral/consult to Urology; Future  Concerning for RCC.  He is asymptomatic currently.  Place referral to Urology for further evaluation and possible treatment.    Hyperlipidemia, unspecified hyperlipidemia type  Continue statin.  Check labs before next visit in 3 months.          Marguerite Barrera MD   Internal Medicine   Primary Care      "

## 2024-11-25 ENCOUNTER — OFFICE VISIT (OUTPATIENT)
Dept: UROLOGY | Facility: CLINIC | Age: 59
End: 2024-11-25
Payer: COMMERCIAL

## 2024-11-25 VITALS
DIASTOLIC BLOOD PRESSURE: 101 MMHG | WEIGHT: 183 LBS | BODY MASS INDEX: 24.79 KG/M2 | HEIGHT: 72 IN | SYSTOLIC BLOOD PRESSURE: 146 MMHG | HEART RATE: 94 BPM

## 2024-11-25 DIAGNOSIS — N28.89 RENAL MASS: ICD-10-CM

## 2024-11-25 PROCEDURE — 99999 PR PBB SHADOW E&M-EST. PATIENT-LVL III: CPT | Mod: PBBFAC,,, | Performed by: UROLOGY

## 2024-11-25 PROCEDURE — 3008F BODY MASS INDEX DOCD: CPT | Mod: CPTII,S$GLB,, | Performed by: UROLOGY

## 2024-11-25 PROCEDURE — 1159F MED LIST DOCD IN RCRD: CPT | Mod: CPTII,S$GLB,, | Performed by: UROLOGY

## 2024-11-25 PROCEDURE — 99215 OFFICE O/P EST HI 40 MIN: CPT | Mod: S$GLB,,, | Performed by: UROLOGY

## 2024-11-25 PROCEDURE — 3044F HG A1C LEVEL LT 7.0%: CPT | Mod: CPTII,S$GLB,, | Performed by: UROLOGY

## 2024-11-25 PROCEDURE — G2211 COMPLEX E/M VISIT ADD ON: HCPCS | Mod: S$GLB,,, | Performed by: UROLOGY

## 2024-11-25 PROCEDURE — 3080F DIAST BP >= 90 MM HG: CPT | Mod: CPTII,S$GLB,, | Performed by: UROLOGY

## 2024-11-25 PROCEDURE — 3077F SYST BP >= 140 MM HG: CPT | Mod: CPTII,S$GLB,, | Performed by: UROLOGY

## 2024-11-25 NOTE — PROGRESS NOTES
[facility]  Urologic Oncology      Date of Service: 11/25/2024    Chief Complaint/Reason for Consultation: R renal mass    Requesting Provider:   Marguerite Barrera MD  [address] [place] [address]    Urologic Oncology Problem List:  Right renal mass     History of Present Illness:   History of Present Illness    CHIEF COMPLAINT:  59-year-old male presents today for evaluation of a small mass on top of one of his kidneys.    KIDNEY MASS:  He reports an incidental finding of a kidney mass at the bottom of the right kidney during a CT scan performed for an acute gastritis attack approximately two weeks ago. He denies experiencing any symptoms related to the mass, including hematuria, dysuria, or right-sided pain.    MEDICAL HISTORY:  He denies any previous abdominal surgeries. He reports having undergone two colonoscopies in the past, which involved anesthesia but did not require incisions.       Imaging: I have reviewed the imaging study CTAP w/ contrast 11/11/24 personally, have independently interpreted this study, and agree with the findings.  4 cm right lower pole renal mass.      Current Problem List:  Patient Active Problem List    Diagnosis Date Noted    Colon polyp, hyperplastic 08/12/2022    Trapezius strain 08/12/2022    Family history of colon cancer 11/15/2016    Hyperlipidemia 11/09/2016        Allergies:  Review of patient's allergies indicates:  No Known Allergies     Medications per EMR:  (Not in a hospital admission)      Past Medical History:  Past Medical History:   Diagnosis Date    Disorder of kidney and ureter     Iritis     x 2     Kidney stones         Past Surgical History:  Past Surgical History:   Procedure Laterality Date    COLONOSCOPY N/A 11/15/2016    Procedure: COLONOSCOPY-Miralax split prep;  Surgeon: Carlo Ward MD;  Location: [facility];  Service: Endoscopy;  Laterality: N/A;    COLONOSCOPY N/A 1/10/2023    Procedure: COLONOSCOPY;  Surgeon:  Guru Barker MD;  Location: [facility] ([address] [place] [address]);  Service: Endoscopy;  Laterality: N/A;  [facility]  pre call done    EYE SURGERY          Family History:  Family History   Problem Relation Name Age of Onset    Heart disease Mother      Colon cancer Father  70    Cancer Father      Liver cancer Father      Alcohol abuse Father      Prostate cancer Neg Hx      Heart attacks under age 50 Neg Hx          Social History:  Social History     Tobacco Use    Smoking status: Former    Smokeless tobacco: Never   Substance Use Topics    Alcohol use: Yes     Alcohol/week: 2.0 standard drinks of alcohol     Types: 2 Cans of beer per week          OBJECTIVE:     Vitals:    11/25/24 1016   BP: (!) 146/101   Pulse: 94   Weight: 83 kg (182 lb 15.7 oz)   Height: 6' (1.829 m)        Physical Exam    General: No acute distress. Nontoxic appearing.  HENT: Normocephalic. Atraumatic.  Respiratory: Normal respiratory effort. No conversational dyspnea. No audible wheezing.  Abdomen: No obvious distension. No abdominal scars.  Skin: No visible abnormalities.  Extremities: No edema upper extremities. No edema lower extremities.  Neurological: Alert and oriented x3. Normal speech.  Psychiatric: Normal mood. Normal affect. No evidence of SI.       -surgical scars none    LAB:    CBC:  Lab Results   Component Value Date    WBC 12.56 11/11/2024    HGB 15.7 11/11/2024    HCT 46 11/11/2024    MCV 87 11/11/2024     11/11/2024         BMP:  Lab Results   Component Value Date     11/11/2024    K 3.8 11/11/2024     11/11/2024    CO2 19 (L) 11/11/2024    BUN 17 11/11/2024    CREATININE 1.3 11/11/2024    CALCIUM 9.8 11/11/2024    ANIONGAP 15 11/11/2024    EGFRNORACEVR >60.0 11/11/2024         ASSESSMENT/PLAN:   Assessment & Plan      RENAL MASS OF UNCERTAIN BEHAVIOR:  - Mr. Bishop presents with incidentally discovered small mass on right kidney, found during CT scan for acute gastritis attack.  -  Mass located at bottom of right kidney, amenable to excision without full nephrectomy.  - 90-95% chance of renal cancer; curative if resected with negative margins.  - Recommend robot-assisted partial nephrectomy as best option due to patient's age and mass size/location.  - Considered and ruled out surveillance and cryoablation as less suitable options.  - Explained robot-assisted partial nephrectomy procedure, including hospital stay and recovery expectations.  - Discussed alternative treatment options (surveillance, cryoablation) and reasons for recommending surgery.  - Detailed potential complications of surgery, including anesthesia risks, bleeding, organ injury, and kidney-specific issues like urine leak, pseudoaneurysm, and positive margins.  - Explained possibility of converting to full nephrectomy or open surgery if necessary.  - Informed about potential for benign pathology despite elevated likelihood of cancer.  - Mr. Bishop to adhere to lifting restrictions for 6 weeks post-surgery.    We then discussed risks, benefits, alternatives associated with this surgery, including complications, detailed below:  -Expectations:  We discussed that the procedure typically takes 2-4 hours, will be performed robotically, and she would typically spend 1-2 nights in the hospital with hopeful discharge on postoperative day 1.  We discussed possible conversion to open and possible radical nephrectomy should it be needed.  -Complications of General anesthesia:  Associated risks include pneumonia, cerebrovascular accident, cardiac events including myocardial infarction, pulmonary embolism, deep vein thrombosis  -Complications of Right Robot Assisted Partial Nephrectomy:  Infection of the superficial and deep spaces, skin infection, bleeding requiring transfusion, injury to the surrounding structures, which on the right side includes the liver, gallbladder, colon, small intestine, duodenum, and the major vascular structures  of the abdomen and pelvis.  We discussed conversion to open, incisional hernia, incisional/fascial dehiscence, ileus, and bowel obstruction.  We also discussed the very rare occurrence of intraoperative or perioperative mortality.  Specific to partial nephrectomy, we discussed urine leak, positive surgical margin, and delayed bleeding or pseudoaneurysm requiring interventional radiology embolization, as well as the need for total or radical nephrectomy and conversion to open    DIAGNOSTIC PROCEDURES:  - Chest XR ordered.  - Additional labs ordered.    FOLLOW-UP:  - Follow up to schedule surgery, likely in early 2025 or potentially on New Year's Mi if patient agrees.  - Follow up for pre-operative chest XR and labs.       Decision for major surgery today    - code applied: patient requires or will require a continuous, longitudinal, and active collaborative plan of care related to this patient's health condition, Renal Mass --the management of which requires the direction of a practitioner with specialized clinical knowledge, skill, and expertise.       This encounter was dictated and transcribed using DeepScribe and FluencyDirect, please excuse any typographical or grammatical errors.

## 2024-11-26 ENCOUNTER — HOSPITAL ENCOUNTER (OUTPATIENT)
Dept: RADIOLOGY | Facility: HOSPITAL | Age: 59
Discharge: HOME | End: 2024-11-26
Attending: UROLOGY
Payer: COMMERCIAL

## 2024-11-26 DIAGNOSIS — N28.89 RENAL MASS: ICD-10-CM

## 2024-11-26 PROCEDURE — 71046 X-RAY EXAM CHEST 2 VIEWS: CPT | Mod: TC

## 2024-11-26 PROCEDURE — 71046 X-RAY EXAM CHEST 2 VIEWS: CPT | Mod: 26,,, | Performed by: RADIOLOGY

## 2024-12-12 ENCOUNTER — PATIENT MESSAGE (OUTPATIENT)
Dept: UROLOGY | Facility: CLINIC | Age: 59
End: 2024-12-12
Payer: COMMERCIAL

## 2024-12-31 DIAGNOSIS — N28.89 RENAL MASS: Primary | ICD-10-CM

## 2025-01-02 ENCOUNTER — TELEPHONE (OUTPATIENT)
Dept: INTERNAL MEDICINE | Facility: CLINIC | Age: 60
End: 2025-01-02
Payer: COMMERCIAL

## 2025-01-02 ENCOUNTER — LAB VISIT (OUTPATIENT)
Dept: LAB | Facility: HOSPITAL | Age: 60
End: 2025-01-02
Attending: UROLOGY
Payer: COMMERCIAL

## 2025-01-02 DIAGNOSIS — N28.89 RENAL MASS: ICD-10-CM

## 2025-01-02 LAB
BACTERIA #/AREA URNS AUTO: NORMAL /HPF
BILIRUB UR QL STRIP: NEGATIVE
CLARITY UR REFRACT.AUTO: CLEAR
COLOR UR AUTO: YELLOW
GLUCOSE UR QL STRIP: NEGATIVE
HGB UR QL STRIP: ABNORMAL
KETONES UR QL STRIP: NEGATIVE
LEUKOCYTE ESTERASE UR QL STRIP: NEGATIVE
MICROSCOPIC COMMENT: NORMAL
NITRITE UR QL STRIP: NEGATIVE
PH UR STRIP: 6 [PH] (ref 5–8)
PROT UR QL STRIP: NEGATIVE
RBC #/AREA URNS AUTO: 3 /HPF (ref 0–4)
SP GR UR STRIP: 1.02 (ref 1–1.03)
SQUAMOUS #/AREA URNS AUTO: 0 /HPF
URN SPEC COLLECT METH UR: ABNORMAL
WBC #/AREA URNS AUTO: 1 /HPF (ref 0–5)

## 2025-01-02 PROCEDURE — 81001 URINALYSIS AUTO W/SCOPE: CPT | Performed by: UROLOGY

## 2025-01-02 PROCEDURE — 87086 URINE CULTURE/COLONY COUNT: CPT | Performed by: UROLOGY

## 2025-01-02 NOTE — TELEPHONE ENCOUNTER
----- Message from JAYLEN Botello sent at 1/2/2025 12:41 PM CST -----  Denia Barrera,       Your patient indicated above requires Pre-Op Clearance/ Risk Stratification for a Robotic Partial Nephrectomy with Dr. Liriano on 1/14/2025 (General anesthesia, 275 minutes).  Anesthesia review is in progress.     If a chart review is appropriate for clearance, please indicate in a note in the EMR.       If not, please advise timely, so that your clinic may schedule an appointment.  The following labs/tests have been ordered:   T&S    If further diagnostics are required please feel free to initiate.       Thank you,   Rosmery Sanchez, JAYLEN   [facility]

## 2025-01-02 NOTE — ANESTHESIA PAT ROS NOTE
01/02/2025  Simeon Bishop is a 59 y.o., male, with Expansile mass of the right kidney measuring up to 4.1 cm, concerning for neoplastic process such as RCC, arrives for preop anesthesia assessment and instructions.    Surgeon's Note:    11/25/2024 Assessment & Plan       RENAL MASS OF UNCERTAIN BEHAVIOR:  - Mr. Bishop presents with incidentally discovered small mass on right kidney, found during CT scan for acute gastritis attack.  - Mass located at bottom of right kidney, amenable to excision without full nephrectomy.  - 90-95% chance of renal cancer; curative if resected with negative margins.  - Recommend robot-assisted partial nephrectomy as best option due to patient's age and mass size/location.  - Considered and ruled out surveillance and cryoablation as less suitable options.  - Explained robot-assisted partial nephrectomy procedure, including hospital stay and recovery expectations.  - Discussed alternative treatment options (surveillance, cryoablation) and reasons for recommending surgery.  - Detailed potential complications of surgery, including anesthesia risks, bleeding, organ injury, and kidney-specific issues like urine leak, pseudoaneurysm, and positive margins.  - Explained possibility of converting to full nephrectomy or open surgery if necessary.  - Informed about potential for benign pathology despite elevated likelihood of cancer.  - Mr. Bishop to adhere to lifting restrictions for 6 weeks post-surgery.  Electronically signed by Mario Alberto Liriano MD at 11/27/2024 10:27 PM     Pre-op Assessment    I have reviewed the Patient Summary Reports.     I have reviewed the Nursing Notes. I have reviewed the NPO Status.   I have reviewed the Medications.     Review of Systems  Anesthesia Hx:  No previous Anesthesia  No previous surgeries: Anxious.  Tolerated colonoscopies without incident. History  of prior surgery of interest to airway management or planning:  Previous anesthesia: General 1/10/2023 COLONOSCOPY with general anesthesia.  Procedure performed at an [facility].      Airway issues documented on chart review include laryngeal mask airway used     Denies Family Hx of Anesthesia complications.    Denies Personal Hx of Anesthesia complications.                    Social:  Alcohol Use   Alcohol  2.0 standard drinks of alcohol/week       Socioeconomic History   Marital Status    Meagan Bishop  Spouse  Emergency Contact  [phone]    Works in [employer].          Family History   Mother  Heart disease  Father ()               Colon cancer (70 y)   Liver cancer   Alcohol abuse            Hematology/Oncology:  Hematology Normal   Oncology Normal                                   EENT/Dental:  denies chronic allergic rhinitis   Eye surgery  Lasik Eyes:                Iritis X2           Cardiovascular:  Exercise tolerance: good    Denies Hypertension.       Denies Angina.     hyperlipidemia  Denies BURNETT.  ECG has been reviewed.   24 EKG  Vent. Rate : 063 BPM     Atrial Rate : 063 BPM      P-R Int : 156 ms          QRS Dur : 102 ms       QT Int : 442 ms       P-R-T Axes : 053 030 018 degrees      QTc Int : 452 ms     Normal sinus rhythm   Right ventricular conduction delay   Nonspecific T wave abnormality Now present   Abnormal ECG   When compared with ECG of 28-MAR-2020 12:39,     Confirmed by EMERY BLANTON MD ([id number]) on 2024 5:24:33 PM      Functional Capacity 4 METS                         Pulmonary:  Denies Pneumonia  Denies COPD.  Denies Asthma.   Denies Shortness of breath.  Denies Recent URI.  Denies Sleep Apnea. XR CHEST PA AND LATERAL     CLINICAL HISTORY:  Other specified disorders of kidney and ureter     TECHNIQUE:  PA and lateral views of the chest were performed.     FINDINGS:  The lungs are clear, with normal appearance of pulmonary vasculature and  no pleural effusion or pneumothorax.    No mass or suspicious nodules.     The cardiac silhouette is normal in size. The hilar and mediastinal contours are unremarkable.     Visualized osseous structures show no acute abnormalities.     Impression:   No acute radiographic findings in the chest.      Electronically signed by:Cameron Alamo MD  Date:    11/26/2024                 Renal/:  Chronic Renal Disease renal calculi    11/11/2024  CT ABDOMEN PELVIS WITH IV CONTRAST     CLINICAL HISTORY:  Abdominal pain, acute, nonlocalized;     TECHNIQUE:  Axial images of the abdomen and pelvis were acquired  after the use of 100 cc [id number] IV contrast.  Coronal and sagittal reconstructions were also obtained     COMPARISON:  CT abdomen 12/11/2008     FINDINGS:  Heart: Normal in size. No pericardial effusion. No significant calcific coronary atherosclerosis.     Lungs: Visualized portions of the lungs are clear.     Liver: Normal in size and contour.  Hypodense parenchyma within the area of the falciform ligament, likely fatty infiltration.  Subcentimeter hypodensity about the hepatic dome, similar to prior CT 12/11/2008.  Additional scattered hypodensities are too small to characterize and not well visualized on previous noncontrast imaging.     Gallbladder: No calcified gallstones.     Bile Ducts: No evidence of dilated ducts.     Pancreas: No mass or peripancreatic fat stranding.     Spleen: Normal size.  No focal lesions.  Likely tiny accessory splenule.     Stomach and duodenum: Significant submucosal edema of the stomach predominantly around the pylorus, which may be seen with gastritis.  Duodenum is unremarkable.     Adrenals: Unremarkable.     Kidneys/ Ureters: Normal in size and location. Normal enhancement. No hydronephrosis or nephrolithiasis. No ureteral dilatation.  Heterogenously enhancing, expansile mass within the lower pole of the right kidney measuring 4.1 x 3.9 cm, concerning for neoplastic process such as  RCC.  The renal vein is unremarkable.  No surrounding lymphadenopathy.     Bladder: No evidence of wall thickening.     Reproductive organs: Prostate is unremarkable.     Bowel/Mesentery: Small bowel is normal in caliber with no evidence of obstruction. No evidence of inflammation or wall thickening.  Colon demonstrates no focal wall thickening.  Appendix is unremarkable.     Peritoneum: No intraperitoneal free air or fluid     Lymph nodes: Subcentimeter perigastric nodes.  Scattered para-aortic and pericaval subcentimeter nodes.  No enlarged nodes by CT criteria.     Vasculature: No aneurysm. No significant calcific atherosclerosis.     Abdominal wall:  Bilateral fat containing inguinal hernias.     Bones: No acute fracture. No suspicious osseous lesions.  Degenerative changes of the spine noting large anterior bridging osteophyte of L4-L5.     Impression:   1. Expansile mass of the right kidney measuring up to 4.1 cm, concerning for neoplastic process such as RCC.  Recommend urologic consult.  2. Submucosal edema of the stomach, possibly acute gastritis, to be correlated with clinical findings  3. Small hypodensities within the liver some of which are too small to characterize.  The largest is similar to prior noncontrast CT compatible with a cyst.  4. Additional findings discussed above.  5. This report was flagged in Epic as abnormal.     Electronically signed by resident: [place]  Date:     11/11/2024   Renal Symptoms/Infections/Stones:    Neoplasm/Tumor.  expansile mass within the lower pole of the right kidney measuring 4.1 x 3.9 cm, concerning for neoplastic process such as RCC.    The renal vein is unremarkable.    No surrounding lymphadenopathy.    Kidney Function/Disease             Hepatic/GI:   Denies PUD.  GERD, well controlled Denies Liver Disease.  Denies Hepatitis.       11/11/24 CT  Stomach and duodenum: Significant submucosal edema of the stomach predominantly around the pylorus, which may be  seen with gastritis.  Duodenum is unremarkable.      1/10/2023  Colonoscopy (N/A)   11/15/2016  Colonoscopy (N/A)               Liver Disease, Fatty Liver     Liver:   Normal in size and contour.    Hypodense parenchyma within the area of the falciform ligament, likely fatty infiltration.    Subcentimeter hypodensity about the hepatic dome, similar to prior CT 12/11/2008.    Additional scattered hypodensities are too small to characterize and not well visualized on previous noncontrast imaging.     Gallbladder: No calcified gallstones.     Bile Ducts: No evidence of dilated ducts.     Pancreas: No mass or peripancreatic fat stranding.     Spleen: Normal size.    No focal lesions.    Likely tiny accessory splenules.  Small hypodensities within the liver some of which are too small to characterize.  The largest is similar to prior noncontrast CT compatible with a cyst     Musculoskeletal:  Arthritis   Denies pain. Musculoskeletal General/Symptoms:     Joint Disease:  Arthritis, Osteoarthritis     Spine Disorders: lumbar Disc disease and Degenerative disease         Lumbar Spine Disorders, Lumbar Disc Disease 11/11/2024 CT  Degenerative changes of the spine noting large anterior bridging osteophyte of L4-L5.  Neurological:  Denies TIA.  Denies CVA. Neuromuscular Disease,   Denies Seizures.    HX TRAPEZIUS STRAIN    Osteoarthritis  Denies Peripheral Neuropathy                          Endocrine:  Endocrine Normal            Dermatological:  Skin Normal    Psych:     Hx stress related depression/anxiety resolved.               Physical Exam  General: Well nourished, Cooperative, Alert and Oriented    Airway:  Mouth Opening: Normal  Tongue: Normal  Neck ROM: Normal ROM    Dental:  Caps / Implants, Intact    Chest/Lungs:  Clear to auscultation, Normal Respiratory Rate    Heart:  Rate: Normal  Rhythm: Regular Rhythm  Sounds: Normal          Anesthesia Assessment: Preoperative EQUATION    Planned Procedure: Procedure(s)  (LRB):  XI ROBOTIC NEPHRECTOMY, PARTIAL (Right)  Requested Anesthesia Type:General  Surgeon: Mario Alberto Liriano MD  Service: Urology  Known or anticipated Date of Surgery:1/14/2025    Surgeon notes: reviewed    Electronic QUestionnaire Assessment completed via nurse interview with patient.        Triage considerations:     Previous anesthesia records:GETA and No problems  1/10/2023 Colonoscopy    Last PCP note: within 3 months , within [facility]   Subspecialty notes: Urology    Other important co-morbidities: HLD and CLAUDINE       Tests already available:  Available tests,  within 3 months , within [facility] .   11/11/2024 CMP, CBC, A1c (5.4), EKG    Optimization:  Anesthesia Preop Clinic Assessment  Indicated.    Medical Opinion Indicated.       Plan:    Testing:  T&S   Pre-anesthesia  visit       Visit focus: concerns in complex and/or prolonged anesthesia, position other than supine     Consultation:Patient's PCP for a statement of optimization      Patient  has previously scheduled Medical Appointment: Not at this time prior to surgery    Navigation: Tests Scheduled.              Consults scheduled.             Results will be tracked by Preop Clinic.

## 2025-01-03 LAB — BACTERIA UR CULT: NO GROWTH

## 2025-01-09 NOTE — DISCHARGE INSTRUCTIONS
Your surgery has been scheduled for:___1/14/2025_    You should report to: [facility], located on the [facility] side of the Second floor of the [facility] ([phone])      Please Note   Tell your doctor if you take Aspirin, products containing Aspirin, herbal medications  or blood thinners, such as Coumadin, Ticlid, or Plavix.  (Consult your provider regarding holding or stopping before surgery).  Arrange for someone to drive you home following surgery.  You will not be allowed to leave the surgical facility alone or drive yourself home following sedation and anesthesia.    Before Surgery  Stop taking all herbal medications, vitamins, and supplements 7 days prior to surgery  No Motrin/Advil (Ibuprofen) 7 days before surgery  No Aleve (Naproxen) 7 days before surgery  Stop Taking Asprin, products containing Asprin _7____days before surgery  Stop taking blood thinners_______days before surgery  No Goody's/BC  Powder 7 days before surgery  Refrain from drinking alcoholic beverages for 24hours before and after surgery  Stop or limit smoking ___7______days before surgery  You may take Tylenol for pain    Night before Surgery  Do not eat or drink after midnight  Take a shower or bath (shower is recommended).  Bathe with Hibiclens soap or an antibacterial soap from the neck down.  If not supplied by your surgeon, hibiclens soap will need to be purchased over the counter in pharmacy.  Rinse soap off thoroughly.  Shampoo your hair with your regular shampoo    The Day of Surgery  Take another bath or shower with hibiclens or any antibacterial soap, to reduce the chance of infection.  Take heart and blood pressure medications with a small sip of water, as advised by the perioperative team.  Do not take fluid pills  You may brush your teeth and rinse your mouth, but do not swall any additional water.   Do not apply perfumes, powder, body lotions or deodorant on the day of  surgery.  Nail polish should be removed.  Do not wear makeup or moisturizer  Wear comfortable clothes, such as a button front shirt and loose fitting pants.  Leave all jewelry, including body piercings, and valuables at home.    Bring any devices you will neeed after surgery such as crutches or canes.  If you have sleep apnea, please bring your CPAP machine  In the event that your physical condition changes including the onset of a cold or respiratory illness, or if you have to delay or cancel your surgery, please notify your surgeon.     Anesthesia: General Anesthesia     You are watched continuously during your procedure by your anesthesia provider.     Youre due to have surgery. During surgery, youll be given medicine called anesthesia or anesthetic. This will keep you comfortable and pain-free. Your anesthesia provider will use general anesthesia.  What is general anesthesia?  General anesthesia puts you into a state like deep sleep. It goes into the bloodstream (IV anesthetics), into the lungs (gas anesthetics), or both. You feel nothing during the procedure. You will not remember it. During the procedure, the anesthesia provider monitors you continuously. He or she checks your heart rate and rhythm, blood pressure, breathing, and blood oxygen.  IV anesthetics. IV anesthetics are given through an IV line in your arm. Theyre often given first. This is so you are asleep before a gas anesthetic is started. Some kinds of IV anesthetics relieve pain. Others relax you. Your doctor will decide which kind is best in your case.  Gas anesthetics. Gas anesthetics are breathed into the lungs. They are often used to keep you asleep. They can be given through a facemask or a tube placed in your larynx or trachea (breathing tube).  If you have a facemask, your anesthesia provider will most likely place it over your nose and mouth while youre still awake. Youll breathe oxygen through the mask as your IV anesthetic is  started. Gas anesthetic may be added through the mask.  If you have a tube in the larynx or trachea, it will be inserted into your throat after youre asleep.  Anesthesia tools and medicines  You will likely have:  IV anesthetics. These are put into an IV line into your bloodstream.  Gas anesthetics. You breathe these anesthetics into your lungs, where they pass into your bloodstream.  Pulse oximeter. This is a small clip that is attached to the end of your finger. This measures your blood oxygen level.  Electrocardiography leads (electrodes). These are small sticky pads that are placed on your chest. They record your heart rate and rhythm.  Blood pressure cuff. This reads your blood pressure.  Risks and possible complications  General anesthesia has some risks. These include:  Breathing problems  Nausea and vomiting  Sore throat or hoarseness (usually temporary)  Allergic reaction to the anesthetic  Irregular heartbeat (rare)  Cardiac arrest (rare)   Anesthesia safety  Follow all instructions you are given for how long not to eat or drink before your procedure.  Be sure your doctor knows what medicines and drugs you take. This includes over-the-counter medicines, herbs, supplements, alcohol or other drugs. You will be asked when those were last taken.  Have an adult family member or friend drive you home after the procedure.  For the first 24 hours after your surgery:  Do not drive or use heavy equipment.  Do not make important decisions or sign legal documents. If important decisions or signing legal documents is necessary during the first 24 hours after surgery, have a trusted family member or spouse act on your behalf.  Avoid alcohol.  Have a responsible adult stay with you. He or she can watch for problems and help keep you safe.  Date Last Reviewed: 12/1/2016 © 2000-2017 [employer]. [address] [place] [address], [place] [address]. All rights reserved. This information is not intended as a substitute  for professional medical care. Always follow your healthcare professional's instructions.

## 2025-01-10 ENCOUNTER — HOSPITAL ENCOUNTER (OUTPATIENT)
Dept: PREADMISSION TESTING | Facility: HOSPITAL | Age: 60
Discharge: HOME | End: 2025-01-10
Attending: UROLOGY | Admitting: UROLOGY
Payer: COMMERCIAL

## 2025-01-10 VITALS
DIASTOLIC BLOOD PRESSURE: 96 MMHG | HEART RATE: 84 BPM | TEMPERATURE: 98 F | SYSTOLIC BLOOD PRESSURE: 147 MMHG | BODY MASS INDEX: 26.43 KG/M2 | OXYGEN SATURATION: 96 % | WEIGHT: 194.88 LBS

## 2025-01-13 ENCOUNTER — ANESTHESIA EVENT (OUTPATIENT)
Dept: SURGERY | Facility: HOSPITAL | Age: 60
DRG: 658 | End: 2025-01-13
Payer: COMMERCIAL

## 2025-01-13 ENCOUNTER — TELEPHONE (OUTPATIENT)
Dept: UROLOGY | Facility: CLINIC | Age: 60
End: 2025-01-13
Payer: COMMERCIAL

## 2025-01-13 ENCOUNTER — PATIENT MESSAGE (OUTPATIENT)
Dept: UROLOGY | Facility: CLINIC | Age: 60
End: 2025-01-13
Payer: COMMERCIAL

## 2025-01-13 NOTE — TELEPHONE ENCOUNTER
Spoke with patient regarding surgery time and instructions. Patient informed of NPO status after midnight, location of surgery, arrival time and that a  will need to be present post operatively due to patient receiving anesthesia. Patient verbalized understanding, agreed and confirmed.

## 2025-01-14 ENCOUNTER — ANESTHESIA (OUTPATIENT)
Dept: SURGERY | Facility: HOSPITAL | Age: 60
DRG: 658 | End: 2025-01-14
Payer: COMMERCIAL

## 2025-01-14 ENCOUNTER — HOSPITAL ENCOUNTER (INPATIENT)
Facility: HOSPITAL | Age: 60
LOS: 1 days | Discharge: HOME | DRG: 658 | End: 2025-01-15
Attending: UROLOGY | Admitting: UROLOGY
Payer: COMMERCIAL

## 2025-01-14 DIAGNOSIS — Z01.818 PREOPERATIVE TESTING: ICD-10-CM

## 2025-01-14 DIAGNOSIS — N28.89 RENAL MASS, RIGHT: Primary | ICD-10-CM

## 2025-01-14 LAB
ABO + RH BLD: NORMAL
ANION GAP SERPL CALC-SCNC: 12 MMOL/L (ref 8–16)
ANION GAP SERPL CALC-SCNC: 9 MMOL/L (ref 8–16)
BASOPHILS # BLD AUTO: 0.04 K/UL (ref 0–0.2)
BASOPHILS NFR BLD: 0.3 % (ref 0–1.9)
BLD GP AB SCN CELLS X3 SERPL QL: NORMAL
BUN SERPL-MCNC: 22 MG/DL (ref 8–23)
BUN SERPL-MCNC: 24 MG/DL (ref 8–23)
CALCIUM SERPL-MCNC: 8.1 MG/DL (ref 8.7–10.5)
CALCIUM SERPL-MCNC: 8.4 MG/DL (ref 8.7–10.5)
CHLORIDE SERPL-SCNC: 108 MMOL/L (ref 95–110)
CHLORIDE SERPL-SCNC: 108 MMOL/L (ref 95–110)
CO2 SERPL-SCNC: 17 MMOL/L (ref 23–29)
CO2 SERPL-SCNC: 22 MMOL/L (ref 23–29)
CREAT SERPL-MCNC: 1.4 MG/DL (ref 0.5–1.4)
CREAT SERPL-MCNC: 1.5 MG/DL (ref 0.5–1.4)
DIFFERENTIAL METHOD BLD: ABNORMAL
EOSINOPHIL # BLD AUTO: 0 K/UL (ref 0–0.5)
EOSINOPHIL NFR BLD: 0.2 % (ref 0–8)
ERYTHROCYTE [DISTWIDTH] IN BLOOD BY AUTOMATED COUNT: 11.9 % (ref 11.5–14.5)
EST. GFR  (NO RACE VARIABLE): 52.6 ML/MIN/1.73 M^2
EST. GFR  (NO RACE VARIABLE): 57.2 ML/MIN/1.73 M^2
GLUCOSE SERPL-MCNC: 139 MG/DL (ref 70–110)
GLUCOSE SERPL-MCNC: 158 MG/DL (ref 70–110)
HCT VFR BLD AUTO: 39 % (ref 40–54)
HGB BLD-MCNC: 13.2 G/DL (ref 14–18)
IMM GRANULOCYTES # BLD AUTO: 0.08 K/UL (ref 0–0.04)
IMM GRANULOCYTES NFR BLD AUTO: 0.6 % (ref 0–0.5)
LYMPHOCYTES # BLD AUTO: 1 K/UL (ref 1–4.8)
LYMPHOCYTES NFR BLD: 8 % (ref 18–48)
MAGNESIUM SERPL-MCNC: 2.1 MG/DL (ref 1.6–2.6)
MCH RBC QN AUTO: 30.4 PG (ref 27–31)
MCHC RBC AUTO-ENTMCNC: 33.8 G/DL (ref 32–36)
MCV RBC AUTO: 90 FL (ref 82–98)
MONOCYTES # BLD AUTO: 0.3 K/UL (ref 0.3–1)
MONOCYTES NFR BLD: 2 % (ref 4–15)
NEUTROPHILS # BLD AUTO: 11.4 K/UL (ref 1.8–7.7)
NEUTROPHILS NFR BLD: 88.9 % (ref 38–73)
NRBC BLD-RTO: 0 /100 WBC
PHOSPHATE SERPL-MCNC: 3.6 MG/DL (ref 2.7–4.5)
PLATELET # BLD AUTO: 221 K/UL (ref 150–450)
PMV BLD AUTO: 8.9 FL (ref 9.2–12.9)
POCT GLUCOSE: 141 MG/DL (ref 70–110)
POTASSIUM SERPL-SCNC: 4.3 MMOL/L (ref 3.5–5.1)
POTASSIUM SERPL-SCNC: 4.3 MMOL/L (ref 3.5–5.1)
POTASSIUM SERPL-SCNC: 5.6 MMOL/L (ref 3.5–5.1)
RBC # BLD AUTO: 4.34 M/UL (ref 4.6–6.2)
SODIUM SERPL-SCNC: 137 MMOL/L (ref 136–145)
SODIUM SERPL-SCNC: 139 MMOL/L (ref 136–145)
SPECIMEN OUTDATE: NORMAL
WBC # BLD AUTO: 12.81 K/UL (ref 3.9–12.7)

## 2025-01-14 PROCEDURE — 63600175 PHARM REV CODE 636 W HCPCS: Performed by: STUDENT IN AN ORGANIZED HEALTH CARE EDUCATION/TRAINING PROGRAM

## 2025-01-14 PROCEDURE — 88307 TISSUE EXAM BY PATHOLOGIST: CPT | Mod: 26,,, | Performed by: PATHOLOGY

## 2025-01-14 PROCEDURE — 84100 ASSAY OF PHOSPHORUS: CPT | Performed by: STUDENT IN AN ORGANIZED HEALTH CARE EDUCATION/TRAINING PROGRAM

## 2025-01-14 PROCEDURE — 36000713 HC OR TIME LEV V EA ADD 15 MIN: Performed by: UROLOGY

## 2025-01-14 PROCEDURE — 88305 TISSUE EXAM BY PATHOLOGIST: CPT | Performed by: PATHOLOGY

## 2025-01-14 PROCEDURE — 88342 IMHCHEM/IMCYTCHM 1ST ANTB: CPT | Performed by: PATHOLOGY

## 2025-01-14 PROCEDURE — 37000009 HC ANESTHESIA EA ADD 15 MINS: Performed by: UROLOGY

## 2025-01-14 PROCEDURE — 71000016 HC POSTOP RECOV ADDL HR: Performed by: UROLOGY

## 2025-01-14 PROCEDURE — 71000033 HC RECOVERY, INTIAL HOUR: Performed by: UROLOGY

## 2025-01-14 PROCEDURE — 63600175 PHARM REV CODE 636 W HCPCS: Performed by: ANESTHESIOLOGY

## 2025-01-14 PROCEDURE — 88305 TISSUE EXAM BY PATHOLOGIST: CPT | Mod: 26,,, | Performed by: PATHOLOGY

## 2025-01-14 PROCEDURE — 25000003 PHARM REV CODE 250: Performed by: STUDENT IN AN ORGANIZED HEALTH CARE EDUCATION/TRAINING PROGRAM

## 2025-01-14 PROCEDURE — 71000039 HC RECOVERY, EACH ADD'L HOUR: Performed by: UROLOGY

## 2025-01-14 PROCEDURE — 25000003 PHARM REV CODE 250: Performed by: ANESTHESIOLOGY

## 2025-01-14 PROCEDURE — 85025 COMPLETE CBC W/AUTO DIFF WBC: CPT | Performed by: STUDENT IN AN ORGANIZED HEALTH CARE EDUCATION/TRAINING PROGRAM

## 2025-01-14 PROCEDURE — 37000008 HC ANESTHESIA 1ST 15 MINUTES: Performed by: UROLOGY

## 2025-01-14 PROCEDURE — 71000015 HC POSTOP RECOV 1ST HR: Performed by: UROLOGY

## 2025-01-14 PROCEDURE — 25000003 PHARM REV CODE 250

## 2025-01-14 PROCEDURE — 80048 BASIC METABOLIC PNL TOTAL CA: CPT

## 2025-01-14 PROCEDURE — 94761 N-INVAS EAR/PLS OXIMETRY MLT: CPT

## 2025-01-14 PROCEDURE — 86850 RBC ANTIBODY SCREEN: CPT | Performed by: STUDENT IN AN ORGANIZED HEALTH CARE EDUCATION/TRAINING PROGRAM

## 2025-01-14 PROCEDURE — 36000712 HC OR TIME LEV V 1ST 15 MIN: Performed by: UROLOGY

## 2025-01-14 PROCEDURE — 88341 IMHCHEM/IMCYTCHM EA ADD ANTB: CPT | Mod: 26,,, | Performed by: PATHOLOGY

## 2025-01-14 PROCEDURE — 76942 ECHO GUIDE FOR BIOPSY: CPT

## 2025-01-14 PROCEDURE — 50543 LAPARO PARTIAL NEPHRECTOMY: CPT | Mod: RT,,, | Performed by: UROLOGY

## 2025-01-14 PROCEDURE — 83735 ASSAY OF MAGNESIUM: CPT | Performed by: STUDENT IN AN ORGANIZED HEALTH CARE EDUCATION/TRAINING PROGRAM

## 2025-01-14 PROCEDURE — 0TB04ZZ EXCISION OF RIGHT KIDNEY, PERCUTANEOUS ENDOSCOPIC APPROACH: ICD-10-PCS | Performed by: UROLOGY

## 2025-01-14 PROCEDURE — 8E0W4CZ ROBOTIC ASSISTED PROCEDURE OF TRUNK REGION, PERCUTANEOUS ENDOSCOPIC APPROACH: ICD-10-PCS | Performed by: UROLOGY

## 2025-01-14 PROCEDURE — 80048 BASIC METABOLIC PNL TOTAL CA: CPT | Mod: 91 | Performed by: STUDENT IN AN ORGANIZED HEALTH CARE EDUCATION/TRAINING PROGRAM

## 2025-01-14 PROCEDURE — 27000221 HC OXYGEN, UP TO 24 HOURS

## 2025-01-14 PROCEDURE — 36415 COLL VENOUS BLD VENIPUNCTURE: CPT

## 2025-01-14 PROCEDURE — 88342 IMHCHEM/IMCYTCHM 1ST ANTB: CPT | Mod: 91 | Performed by: PATHOLOGY

## 2025-01-14 PROCEDURE — 21400001 HC TELEMETRY ROOM

## 2025-01-14 PROCEDURE — D9220A PRA ANESTHESIA: Mod: ,,, | Performed by: ANESTHESIOLOGY

## 2025-01-14 PROCEDURE — 99900035 HC TECH TIME PER 15 MIN (STAT)

## 2025-01-14 PROCEDURE — 88342 IMHCHEM/IMCYTCHM 1ST ANTB: CPT | Mod: 26,,, | Performed by: PATHOLOGY

## 2025-01-14 PROCEDURE — 27201423 OPTIME MED/SURG SUP & DEVICES STERILE SUPPLY: Performed by: UROLOGY

## 2025-01-14 PROCEDURE — 88307 TISSUE EXAM BY PATHOLOGIST: CPT | Performed by: PATHOLOGY

## 2025-01-14 PROCEDURE — C1729 CATH, DRAINAGE: HCPCS | Performed by: UROLOGY

## 2025-01-14 RX ORDER — OXYCODONE HYDROCHLORIDE 5 MG/1
5 TABLET ORAL EVERY 4 HOURS PRN
Qty: 10 TABLET | Refills: 0 | Status: SHIPPED | OUTPATIENT
Start: 2025-01-14

## 2025-01-14 RX ORDER — HYDROMORPHONE HYDROCHLORIDE 1 MG/ML
1 INJECTION, SOLUTION INTRAMUSCULAR; INTRAVENOUS; SUBCUTANEOUS
Status: DISCONTINUED | OUTPATIENT
Start: 2025-01-14 | End: 2025-01-15 | Stop reason: HOSPADM

## 2025-01-14 RX ORDER — LIDOCAINE HYDROCHLORIDE 10 MG/ML
INJECTION, SOLUTION EPIDURAL; INFILTRATION; INTRACAUDAL; PERINEURAL
Status: DISPENSED
Start: 2025-01-14 | End: 2025-01-14

## 2025-01-14 RX ORDER — FENTANYL CITRATE 50 UG/ML
25 INJECTION, SOLUTION INTRAMUSCULAR; INTRAVENOUS EVERY 5 MIN PRN
Status: DISCONTINUED | OUTPATIENT
Start: 2025-01-14 | End: 2025-01-14 | Stop reason: HOSPADM

## 2025-01-14 RX ORDER — ACETAMINOPHEN 500 MG
1000 TABLET ORAL
Status: COMPLETED | OUTPATIENT
Start: 2025-01-14 | End: 2025-01-14

## 2025-01-14 RX ORDER — GLUCAGON 1 MG
1 KIT INJECTION
Status: DISCONTINUED | OUTPATIENT
Start: 2025-01-14 | End: 2025-01-14 | Stop reason: HOSPADM

## 2025-01-14 RX ORDER — PROCHLORPERAZINE EDISYLATE 5 MG/ML
5 INJECTION INTRAMUSCULAR; INTRAVENOUS EVERY 30 MIN PRN
Status: DISCONTINUED | OUTPATIENT
Start: 2025-01-14 | End: 2025-01-14 | Stop reason: HOSPADM

## 2025-01-14 RX ORDER — ACETAMINOPHEN 500 MG
1000 TABLET ORAL EVERY 6 HOURS
Status: DISCONTINUED | OUTPATIENT
Start: 2025-01-14 | End: 2025-01-15 | Stop reason: HOSPADM

## 2025-01-14 RX ORDER — TALC
6 POWDER (GRAM) TOPICAL NIGHTLY PRN
Status: DISCONTINUED | OUTPATIENT
Start: 2025-01-14 | End: 2025-01-15 | Stop reason: HOSPADM

## 2025-01-14 RX ORDER — DIPHENHYDRAMINE HYDROCHLORIDE 50 MG/ML
25 INJECTION INTRAMUSCULAR; INTRAVENOUS EVERY 6 HOURS PRN
Status: DISCONTINUED | OUTPATIENT
Start: 2025-01-14 | End: 2025-01-14 | Stop reason: HOSPADM

## 2025-01-14 RX ORDER — KETOROLAC TROMETHAMINE 15 MG/ML
15 INJECTION, SOLUTION INTRAMUSCULAR; INTRAVENOUS
Status: COMPLETED | OUTPATIENT
Start: 2025-01-14 | End: 2025-01-14

## 2025-01-14 RX ORDER — ONDANSETRON HYDROCHLORIDE 2 MG/ML
INJECTION, SOLUTION INTRAVENOUS
Status: DISCONTINUED | OUTPATIENT
Start: 2025-01-14 | End: 2025-01-14

## 2025-01-14 RX ORDER — PROPOFOL 10 MG/ML
VIAL (ML) INTRAVENOUS
Status: DISCONTINUED | OUTPATIENT
Start: 2025-01-14 | End: 2025-01-14

## 2025-01-14 RX ORDER — OXYCODONE HYDROCHLORIDE 5 MG/1
5 TABLET ORAL EVERY 4 HOURS PRN
Status: DISCONTINUED | OUTPATIENT
Start: 2025-01-14 | End: 2025-01-15 | Stop reason: HOSPADM

## 2025-01-14 RX ORDER — CEFAZOLIN 2 G/1
2 INJECTION, POWDER, FOR SOLUTION INTRAMUSCULAR; INTRAVENOUS
Status: COMPLETED | OUTPATIENT
Start: 2025-01-14 | End: 2025-01-14

## 2025-01-14 RX ORDER — FAMOTIDINE 20 MG/1
20 TABLET, FILM COATED ORAL 2 TIMES DAILY
Status: DISCONTINUED | OUTPATIENT
Start: 2025-01-14 | End: 2025-01-15

## 2025-01-14 RX ORDER — PREGABALIN 150 MG/1
150 CAPSULE ORAL NIGHTLY
Status: DISCONTINUED | OUTPATIENT
Start: 2025-01-14 | End: 2025-01-15 | Stop reason: HOSPADM

## 2025-01-14 RX ORDER — SODIUM CHLORIDE 0.9 % (FLUSH) 0.9 %
3 SYRINGE (ML) INJECTION
Status: DISCONTINUED | OUTPATIENT
Start: 2025-01-14 | End: 2025-01-14 | Stop reason: HOSPADM

## 2025-01-14 RX ORDER — LIDOCAINE HYDROCHLORIDE 20 MG/ML
INJECTION, SOLUTION EPIDURAL; INFILTRATION; INTRACAUDAL; PERINEURAL
Status: DISCONTINUED | OUTPATIENT
Start: 2025-01-14 | End: 2025-01-14

## 2025-01-14 RX ORDER — CALCIUM GLUCONATE 20 MG/ML
1 INJECTION, SOLUTION INTRAVENOUS ONCE
Status: COMPLETED | OUTPATIENT
Start: 2025-01-14 | End: 2025-01-14

## 2025-01-14 RX ORDER — HYDROMORPHONE HYDROCHLORIDE 1 MG/ML
0.2 INJECTION, SOLUTION INTRAMUSCULAR; INTRAVENOUS; SUBCUTANEOUS EVERY 5 MIN PRN
Status: DISCONTINUED | OUTPATIENT
Start: 2025-01-14 | End: 2025-01-14 | Stop reason: HOSPADM

## 2025-01-14 RX ORDER — MIDAZOLAM HYDROCHLORIDE 1 MG/ML
INJECTION INTRAMUSCULAR; INTRAVENOUS
Status: DISCONTINUED | OUTPATIENT
Start: 2025-01-14 | End: 2025-01-14

## 2025-01-14 RX ORDER — ACETAMINOPHEN 500 MG
1000 TABLET ORAL EVERY 8 HOURS
Qty: 42 TABLET | Refills: 0 | Status: SHIPPED | OUTPATIENT
Start: 2025-01-14 | End: 2025-01-28

## 2025-01-14 RX ORDER — SODIUM CHLORIDE 9 MG/ML
INJECTION, SOLUTION INTRAVENOUS CONTINUOUS
Status: DISCONTINUED | OUTPATIENT
Start: 2025-01-14 | End: 2025-01-14

## 2025-01-14 RX ORDER — KETAMINE HCL IN 0.9 % NACL 50 MG/5 ML
SYRINGE (ML) INTRAVENOUS
Status: DISCONTINUED | OUTPATIENT
Start: 2025-01-14 | End: 2025-01-14

## 2025-01-14 RX ORDER — OXYCODONE HYDROCHLORIDE 10 MG/1
10 TABLET ORAL EVERY 4 HOURS PRN
Status: DISCONTINUED | OUTPATIENT
Start: 2025-01-14 | End: 2025-01-15 | Stop reason: HOSPADM

## 2025-01-14 RX ORDER — SODIUM CHLORIDE 9 MG/ML
INJECTION, SOLUTION INTRAVENOUS CONTINUOUS
Status: DISCONTINUED | OUTPATIENT
Start: 2025-01-14 | End: 2025-01-15 | Stop reason: HOSPADM

## 2025-01-14 RX ORDER — PREGABALIN 75 MG/1
150 CAPSULE ORAL
Status: COMPLETED | OUTPATIENT
Start: 2025-01-14 | End: 2025-01-14

## 2025-01-14 RX ORDER — ONDANSETRON HYDROCHLORIDE 2 MG/ML
4 INJECTION, SOLUTION INTRAVENOUS EVERY 6 HOURS PRN
Status: DISCONTINUED | OUTPATIENT
Start: 2025-01-14 | End: 2025-01-15 | Stop reason: HOSPADM

## 2025-01-14 RX ORDER — PHENYLEPHRINE HYDROCHLORIDE 10 MG/ML
INJECTION INTRAVENOUS
Status: DISCONTINUED | OUTPATIENT
Start: 2025-01-14 | End: 2025-01-14

## 2025-01-14 RX ORDER — POLYETHYLENE GLYCOL 3350 17 G/17G
17 POWDER, FOR SOLUTION ORAL DAILY
Qty: 238 G | Refills: 0 | Status: SHIPPED | OUTPATIENT
Start: 2025-01-14 | End: 2025-01-29

## 2025-01-14 RX ORDER — DEXAMETHASONE SODIUM PHOSPHATE 4 MG/ML
INJECTION, SOLUTION INTRA-ARTICULAR; INTRALESIONAL; INTRAMUSCULAR; INTRAVENOUS; SOFT TISSUE
Status: DISCONTINUED | OUTPATIENT
Start: 2025-01-14 | End: 2025-01-14

## 2025-01-14 RX ORDER — METHOCARBAMOL 500 MG/1
1000 TABLET, FILM COATED ORAL EVERY 6 HOURS PRN
Status: DISCONTINUED | OUTPATIENT
Start: 2025-01-14 | End: 2025-01-15 | Stop reason: HOSPADM

## 2025-01-14 RX ORDER — MIDAZOLAM HYDROCHLORIDE 1 MG/ML
.5-4 INJECTION, SOLUTION INTRAMUSCULAR; INTRAVENOUS
Status: DISCONTINUED | OUTPATIENT
Start: 2025-01-14 | End: 2025-01-14

## 2025-01-14 RX ORDER — HEPARIN SODIUM 5000 [USP'U]/ML
5000 INJECTION, SOLUTION INTRAVENOUS; SUBCUTANEOUS
Status: COMPLETED | OUTPATIENT
Start: 2025-01-14 | End: 2025-01-14

## 2025-01-14 RX ORDER — ROCURONIUM BROMIDE 10 MG/ML
INJECTION, SOLUTION INTRAVENOUS
Status: DISCONTINUED | OUTPATIENT
Start: 2025-01-14 | End: 2025-01-14

## 2025-01-14 RX ORDER — DIPHENHYDRAMINE HCL 25 MG
25 CAPSULE ORAL EVERY 12 HOURS PRN
Status: DISCONTINUED | OUTPATIENT
Start: 2025-01-14 | End: 2025-01-15 | Stop reason: HOSPADM

## 2025-01-14 RX ORDER — HALOPERIDOL 5 MG/ML
0.5 INJECTION INTRAMUSCULAR EVERY 10 MIN PRN
Status: DISCONTINUED | OUTPATIENT
Start: 2025-01-14 | End: 2025-01-14 | Stop reason: HOSPADM

## 2025-01-14 RX ORDER — KETOROLAC TROMETHAMINE 30 MG/ML
15 INJECTION, SOLUTION INTRAMUSCULAR; INTRAVENOUS EVERY 6 HOURS
Status: DISCONTINUED | OUTPATIENT
Start: 2025-01-14 | End: 2025-01-15

## 2025-01-14 RX ORDER — TAMSULOSIN HYDROCHLORIDE 0.4 MG/1
0.4 CAPSULE ORAL NIGHTLY
Status: DISCONTINUED | OUTPATIENT
Start: 2025-01-14 | End: 2025-01-15 | Stop reason: HOSPADM

## 2025-01-14 RX ORDER — FENTANYL CITRATE 50 UG/ML
25-200 INJECTION, SOLUTION INTRAMUSCULAR; INTRAVENOUS
Status: DISCONTINUED | OUTPATIENT
Start: 2025-01-14 | End: 2025-01-14

## 2025-01-14 RX ORDER — FENTANYL CITRATE 50 UG/ML
INJECTION, SOLUTION INTRAMUSCULAR; INTRAVENOUS
Status: DISCONTINUED | OUTPATIENT
Start: 2025-01-14 | End: 2025-01-14

## 2025-01-14 RX ADMIN — OXYCODONE HYDROCHLORIDE 10 MG: 10 TABLET ORAL at 01:01

## 2025-01-14 RX ADMIN — FAMOTIDINE 20 MG: 20 TABLET ORAL at 01:01

## 2025-01-14 RX ADMIN — KETOROLAC TROMETHAMINE 15 MG: 30 INJECTION, SOLUTION INTRAMUSCULAR; INTRAVENOUS at 12:01

## 2025-01-14 RX ADMIN — ROCURONIUM BROMIDE 50 MG: 10 INJECTION, SOLUTION INTRAVENOUS at 08:01

## 2025-01-14 RX ADMIN — PROPOFOL 150 MG: 10 INJECTION, EMULSION INTRAVENOUS at 07:01

## 2025-01-14 RX ADMIN — ACETAMINOPHEN 1000 MG: 500 TABLET ORAL at 12:01

## 2025-01-14 RX ADMIN — PROPOFOL 30 MG: 10 INJECTION, EMULSION INTRAVENOUS at 10:01

## 2025-01-14 RX ADMIN — TAMSULOSIN HYDROCHLORIDE 0.4 MG: 0.4 CAPSULE ORAL at 08:01

## 2025-01-14 RX ADMIN — Medication 20 MG: at 07:01

## 2025-01-14 RX ADMIN — SODIUM CHLORIDE, SODIUM GLUCONATE, SODIUM ACETATE, POTASSIUM CHLORIDE, MAGNESIUM CHLORIDE, SODIUM PHOSPHATE, DIBASIC, AND POTASSIUM PHOSPHATE: .53; .5; .37; .037; .03; .012; .00082 INJECTION, SOLUTION INTRAVENOUS at 06:01

## 2025-01-14 RX ADMIN — MIDAZOLAM HYDROCHLORIDE 2 MG: 2 INJECTION, SOLUTION INTRAMUSCULAR; INTRAVENOUS at 07:01

## 2025-01-14 RX ADMIN — FENTANYL CITRATE 100 MCG: 50 INJECTION, SOLUTION INTRAMUSCULAR; INTRAVENOUS at 07:01

## 2025-01-14 RX ADMIN — ROCURONIUM BROMIDE 100 MG: 10 INJECTION, SOLUTION INTRAVENOUS at 07:01

## 2025-01-14 RX ADMIN — Medication 30 MG: at 07:01

## 2025-01-14 RX ADMIN — SODIUM CHLORIDE, SODIUM GLUCONATE, SODIUM ACETATE, POTASSIUM CHLORIDE, MAGNESIUM CHLORIDE, SODIUM PHOSPHATE, DIBASIC, AND POTASSIUM PHOSPHATE: .53; .5; .37; .037; .03; .012; .00082 INJECTION, SOLUTION INTRAVENOUS at 09:01

## 2025-01-14 RX ADMIN — KETOROLAC TROMETHAMINE 15 MG: 30 INJECTION, SOLUTION INTRAMUSCULAR; INTRAVENOUS at 05:01

## 2025-01-14 RX ADMIN — ROCURONIUM BROMIDE 50 MG: 10 INJECTION, SOLUTION INTRAVENOUS at 07:01

## 2025-01-14 RX ADMIN — FAMOTIDINE 20 MG: 20 TABLET ORAL at 08:01

## 2025-01-14 RX ADMIN — LIDOCAINE HYDROCHLORIDE 100 MG: 20 INJECTION, SOLUTION EPIDURAL; INFILTRATION; INTRACAUDAL; PERINEURAL at 07:01

## 2025-01-14 RX ADMIN — ONDANSETRON 4 MG: 2 INJECTION INTRAMUSCULAR; INTRAVENOUS at 10:01

## 2025-01-14 RX ADMIN — ACETAMINOPHEN 1000 MG: 500 TABLET ORAL at 05:01

## 2025-01-14 RX ADMIN — DEXAMETHASONE SODIUM PHOSPHATE 8 MG: 4 INJECTION, SOLUTION INTRAMUSCULAR; INTRAVENOUS at 07:01

## 2025-01-14 RX ADMIN — PREGABALIN 150 MG: 150 CAPSULE ORAL at 08:01

## 2025-01-14 RX ADMIN — METHOCARBAMOL 1000 MG: 500 TABLET ORAL at 01:01

## 2025-01-14 RX ADMIN — CALCIUM GLUCONATE 1 G: 20 INJECTION, SOLUTION INTRAVENOUS at 04:01

## 2025-01-14 RX ADMIN — SUGAMMADEX 400 MG: 100 INJECTION, SOLUTION INTRAVENOUS at 10:01

## 2025-01-14 RX ADMIN — CEFAZOLIN 2 G: 2 INJECTION, POWDER, FOR SOLUTION INTRAMUSCULAR; INTRAVENOUS at 07:01

## 2025-01-14 RX ADMIN — PREGABALIN 150 MG: 75 CAPSULE ORAL at 06:01

## 2025-01-14 RX ADMIN — KETOROLAC TROMETHAMINE 15 MG: 15 INJECTION, SOLUTION INTRAMUSCULAR; INTRAVENOUS at 06:01

## 2025-01-14 RX ADMIN — ACETAMINOPHEN 1000 MG: 500 TABLET ORAL at 06:01

## 2025-01-14 RX ADMIN — HEPARIN SODIUM 5000 UNITS: 5000 INJECTION INTRAVENOUS; SUBCUTANEOUS at 06:01

## 2025-01-14 RX ADMIN — SODIUM CHLORIDE: 9 INJECTION, SOLUTION INTRAVENOUS at 11:01

## 2025-01-14 RX ADMIN — ROCURONIUM BROMIDE 50 MG: 10 INJECTION, SOLUTION INTRAVENOUS at 09:01

## 2025-01-14 RX ADMIN — PHENYLEPHRINE HYDROCHLORIDE 200 MCG: 10 INJECTION INTRAVENOUS at 07:01

## 2025-01-14 RX ADMIN — DEXMEDETOMIDINE HYDROCHLORIDE 0.3 MCG/KG/HR: 100 INJECTION, SOLUTION, CONCENTRATE INTRAVENOUS at 07:01

## 2025-01-14 NOTE — ANESTHESIA PROCEDURE NOTES
Intubation    Date/Time: 1/14/2025 7:05 AM    Performed by: Artem Ledbetter MD  Authorized by: Artem Ledbetter MD    Intubation:     Induction:  Intravenous    Intubated:  Postinduction    Mask Ventilation:  Easy with oral airway    Attempts:  1    Attempted By:  Staff anesthesiologist    Method of Intubation:  Direct    Blade:  Carrillo 2    Laryngeal View Grade: Grade IIA - cords partially seen      Difficult Airway Encountered?: No      Complications:  None    Airway Device:  Oral endotracheal tube    Airway Device Size:  7.5    Style/Cuff Inflation:  Cuffed    Inflation Amount (mL):  8    Tube secured:  23    Secured at:  The teeth    Placement Verified By:  Capnometry    Complicating Factors:  None    Findings Post-Intubation:  Atraumatic/condition of teeth unchanged

## 2025-01-14 NOTE — ANESTHESIA PREPROCEDURE EVALUATION
01/14/2025  Pre-operative evaluation for Procedure(s) (LRB):  XI ROBOTIC NEPHRECTOMY, PARTIAL (Right)    Simeon Bishop is a 59 y.o. male with renal mass here for partial nephrectomy    Patient Active Problem List   Diagnosis    Hyperlipidemia    Family history of colon cancer    Colon polyp, hyperplastic    Trapezius strain       Review of patient's allergies indicates:  No Known Allergies    No current facility-administered medications on file prior to encounter.     Current Outpatient Medications on File Prior to Encounter   Medication Sig Dispense Refill    rosuvastatin (CRESTOR) 10 MG tablet Take 1 tablet (10 mg total) by mouth once daily. 90 tablet 3    cyclobenzaprine (FLEXERIL) 5 MG tablet Take 1 tablet (5 mg total) by mouth nightly. 30 tablet 1    famotidine (PEPCID) 20 MG tablet Take 1 tablet (20 mg total) by mouth 2 (two) times daily. 20 tablet 0       Past Surgical History:   Procedure Laterality Date    COLONOSCOPY N/A 11/15/2016    Procedure: COLONOSCOPY-Miralax split prep;  Surgeon: Carlo Ward MD;  [facility] ENDO;  Service: Endoscopy;  Laterality: N/A;    COLONOSCOPY N/A 1/10/2023    Procedure: COLONOSCOPY;  Surgeon: Guru Barker MD;  Location: [facility] ENDO ([address] [place] [address]);  Service: Endoscopy;  Laterality: N/A;  instructions sent to [facility] su[facility]  pre call done    EYE SURGERY         Social History     Socioeconomic History    Marital status:    Tobacco Use    Smoking status: Former    Smokeless tobacco: Never   Substance and Sexual Activity    Alcohol use: Yes     Alcohol/week: 2.0 standard drinks of alcohol     Types: 2 Cans of beer per week    Drug use: No     Comment: 1 week ago    Sexual activity: Yes     Partners: Female     Social Drivers of Health     Financial Resource Strain: Patient Declined (3/4/2024)    Overall Financial Resource Strain  (CARDIA)     Difficulty of Paying Living Expenses: Patient declined   Food Insecurity: Patient Declined (3/4/2024)    Hunger Vital Sign     Worried About Running Out of Food in the Last Year: Patient declined     Ran Out of Food in the Last Year: Patient declined   Transportation Needs: No Transportation Needs (3/4/2024)    PRAPARE - Transportation     Lack of Transportation (Medical): No     Lack of Transportation (Non-Medical): No   Physical Activity: Unknown (3/4/2024)    Exercise Vital Sign     Days of Exercise per Week: 7 days   Stress: Stress Concern Present (3/4/2024)    [employer] - Occupational Stress Questionnaire     Feeling of Stress : To some extent   Housing Stability: Patient Declined (3/4/2024)    Housing Stability Vital Sign     Unable to Pay for Housing in the Last Year: Patient declined     Unstable Housing in the Last Year: Patient declined               Pre-op Assessment    I have reviewed the Patient Summary Reports.     I have reviewed the Nursing Notes. I have reviewed the NPO Status.      Review of Systems  Anesthesia Hx:  No problems with previous Anesthesia                Cardiovascular:  Cardiovascular Normal                                              Pulmonary:  Pulmonary Normal                       Renal/:  Chronic Renal Disease                Hepatic/GI:  Hepatic/GI Normal                    Neurological:    Neuromuscular Disease,                                   Endocrine:  Endocrine Normal                Physical Exam    Airway:  Mallampati: II   Mouth Opening: Normal  Tongue: Normal    Chest/Lungs:  Normal Respiratory Rate    Heart:  Rhythm: Regular Rhythm        Anesthesia Plan  Type of Anesthesia, risks & benefits discussed:    Anesthesia Type: Gen ETT  Intra-op Monitoring Plan: Standard ASA Monitors  Induction:  IV  Informed Consent: Informed consent signed with the Patient and all parties understand the risks and agree with anesthesia plan.   All questions answered.   ASA Score: 2    Ready For Surgery From Anesthesia Perspective.     .

## 2025-01-14 NOTE — ANESTHESIA POSTPROCEDURE EVALUATION
Anesthesia Post Evaluation    Patient: Simeon Bishop    Procedure(s) Performed: Procedure(s) (LRB):  XI ROBOTIC NEPHRECTOMY, PARTIAL (Right)    Final Anesthesia Type: general      Patient location during evaluation: PACU  Patient participation: Yes- Able to Participate  Level of consciousness: awake  Post-procedure vital signs: reviewed and stable  Pain management: adequate  Airway patency: patent    PONV status at discharge: No PONV  Anesthetic complications: no      Cardiovascular status: hemodynamically stable  Follow-up not needed.              Vitals Value Taken Time   /84 01/14/25 1331   Temp 36.6 °C (97.9 °F) 01/14/25 1118   Pulse 77 01/14/25 1332   Resp 18 01/14/25 1332   SpO2 99 % 01/14/25 1332   Vitals shown include unfiled device data.      No case tracking events are documented in the log.      Pain/Clemencia Score: Pain Rating Prior to Med Admin: 7 (1/14/2025  1:09 PM)  Pain Rating Post Med Admin: 0 (1/14/2025  6:55 AM)  Clemencia Score: 7 (1/14/2025 12:00 PM)

## 2025-01-14 NOTE — ANESTHESIA PROCEDURE NOTES
R ROSA ISELA SS    Patient location during procedure: pre-op   Block not for primary anesthetic.  Reason for block: at surgeon's request and post-op pain management   Post-op Pain Location: R flank pain   Start time: 1/14/2025 6:35 AM  Timeout: 1/14/2025 6:35 AM   End time: 1/14/2025 6:40 AM    Staffing  Authorizing Provider: Addy Carreno MD  Performing Provider: Kaushik Roberts DO    Staffing  Performed by: Kaushik Roberts DO  Authorized by: Addy Carreno MD    Preanesthetic Checklist  Completed: patient identified, IV checked, site marked, risks and benefits discussed, surgical consent, monitors and equipment checked, pre-op evaluation and timeout performed  Peripheral Block  Patient position: sitting  Prep: ChloraPrep  Patient monitoring: heart rate, cardiac monitor, continuous pulse ox, continuous capnometry and frequent blood pressure checks  Block type: erector spinae plane  Laterality: right  Injection technique: single shot  Interspace: T8-9    Needle  Needle type: Stimuplex   Needle gauge: 20 G  Needle length: 4 in  Needle localization: anatomical landmarks and ultrasound guidance   -ultrasound image captured on disc.  Assessment  Injection assessment: negative aspiration, negative parasthesia and local visualized surrounding nerve  Paresthesia pain: none  Heart rate change: no  Slow fractionated injection: yes        Additional Notes  A time out was conducted. Site rolando confirmed with team and patient. Allergies reviewed.   Vital signs stable throughout block. RN monitoring vitals throughout.   Needle advanced under continuous ultrasound guidance.  Local injected incrementally after confirming negative aspiration. No signs or symptoms of intravascular or intraneural injection noted.   No persistent paresthesias elicited or expressed. Patient tolerated procedure well.  30 cc 0.375% bupi with epinephrine 1:300K, PF dexamethasone 1 mg, and clonidine 50 mcg used for the block.

## 2025-01-14 NOTE — OP NOTE
[facility]  Operative Note    Date: 01/14/2025    Pre-Op Diagnosis: right renal mass suspicious for renal cell carcinoma    Active Problem List with Overview Notes    Diagnosis Date Noted    Colon polyp, hyperplastic 08/12/2022    Trapezius strain 08/12/2022    Family history of colon cancer 11/15/2016    Hyperlipidemia 11/09/2016       Post-Op Diagnosis: same    Procedure(s) Performed:   1.  Robotic right partial nephrectomy  2.  Intraoperative US for localization of renal tumor    Specimen(s):   1.  Right renal mass  2.  Perirenal fat    Surgeon: Mario Alberto Liriano MD    Assistant: Donis Andujar MD; Moe Gilliam MD    Bedside Assistant: Chase Shaw CSFA    Anesthesia: General endotracheal anesthesia    Indications: Simeon Bishop is a 59 y.o. male with right renal mass suspicious for renal cell carcinoma.  After discussion of management options and risks and benefits associated with each the patient has elected to pursue surgical management.      Findings:   - tumor appeared to grossly invade into the collecting system, this was enucleated at this point.  - the collecting system was closed separately and appeared water tight  - ureter was visualized to be free of injury and peristalsis at the conclusion of the case.   - warm ischemia time 31 minutes    EBL: 30 mL    Drains:   1.  16 Fr parry catheter  2.  15 mm Memo drain to RLQ    Anesthesia: General endotracheal anesthesia    Complications:  none    Procedure in Detail: After discussion of risks and benefits of the procedure, informed consent was obtained.  The patient was brought to the operating room and placed supine on the operating table.  SCDs were applied and working prior to induction of anesthesia.  General anesthesia was administered.  A 16 Fr Parry catheter was placed in the standard fashion with 10 ml sterile water used to inflate the balloon.  An OG tube was placed.  The patient was then moved into the modified flank  position with the right side up. The patient was appropriately padded and secured to the table.  The patient was then prepped and draped in the usual sterile fashion.  Timeout was performed and preoperative antibiotics were confirmed.      A Veress needle was introduced into the abdomen.  Entry into the peritoneal cavity was confirmed with the drop test and an initial insufflation pressure of <10mmHg.  Aspiration during our drop test revealed no blood or succus.  The peritoneal cavity was insufflated up to 15 mmHg and the Veress was removed.  The location of the 8 mm camera port was marked with a marking pen and incised sharply using a 15 blade.  The 8 mm port was then introduced.  The camera was passed through the port and the abdomen was inspected and found to be free of bowel or vascular injury.  Using direct vision the remaining robotic ports were placed, just below the right costal margin and lower on the right abdomen.  A 12 mm assistant port was placed in the upper midline and a 5 mm assistant port was placed cephalad to this as a liver retractor. A locking grasper was then placed through this 5 mm port to retract the liver.  Each trocar was introduced under direct vision ensuring no injury to the underlying abdominal contents.      Dissection began along the white line of Toldt, reflecting the colon medially away from the kidney. The hepatic reflection was released.  The psoas muscle was identified.  Care was taken to avoid injury to the duodenum.  Once the colon was reflected, dissection was carried out just below the kidney, and the ureter was identified.  The ureter was elevated and we continued our dissection toward the lower pole of the kidney proximally until we identified the renal hilum.  The ureter remained away from our dissection throughout the case.      Careful dissection of the hilum was performed.  The renal vein was easily identified anteriorly.  The renal artery was identified posterior to  the renal vein.  The artery was isolated circumferentially.  Hemostasis was excellent.      The fat overlying the kidney was opened and freed from the underlying capsule along the kidney's length.  This revealed the lower pole tumor.  The kidney was freed from its surrounding attachments to allow adequate mobility to visualize the entire lower pole.  The US probe was used to delineate the margins of the kidney, which were scored using hot scissors to ensure the entire tumor was identified.      Two bulldog clamps were placed on the artery.  The tumor was then resected using cold scissors.  Margins were grossly negative. There was entry into the collecting system.  The tumor was passed into the LLQ.      The collecting system was closed using a running 4-0 PDS.     A renorrhaphy was performed using 2-0 vlock to close the deep resection bed. The capsule was then closed using 0 vicryl with the sliding weck clip technique. The bulldog clamps were released for a total warm ischemia time of 31 minutes.  Hemostasis of the renorrhaphy was excellent and the kidney was noted to have good color and turgor.  The ureter was again noted to be well away from the resection site and clamped renal artery.  FloSeal was placed into the renorrhaphy bed.    The tumor  and perirenal fat was placed into an EndoCatch bag via the 12 mm upper midline assistant port.  A 15 mm Memo drain was placed into the peritoneal cavity around the resection bed via the RLQ robotic port.      The robot was undocked and all trocars were removed.  The 12 mm upper midline incision was extended from skin down to fascia to allow removal of the specimen.  This was inspected and found to be intact. This was passed off the field for pathologic analysis.      The extraction site fascia was closed using 1-0 PDS.  All skin incisions were closed using 4-0 monocryl.  Marcaine was injected for local anesthesia.  The drain was secured to the skin using a 2-0 nylon.  Dermabond was applied to the incisions.     The patient tolerated the procedure well and was transferred to the recovery room in stable condition.    Disposition: The patient will remain on the urology service overnight for observation.     Donis Andujar MD

## 2025-01-14 NOTE — OR NURSING
Pre-op safety checklist completed. Wife not present at bedside. Called wife and educated about periop & PACU process. Patient's clothing, shoes & ring placed in locker 5 in [facility]. All questions answered at this time.

## 2025-01-14 NOTE — NURSING TRANSFER
Nursing Transfer Note      1/14/2025   2:54 PM    Nurse giving handoff:kaylee simpson   Nurse receiving handoff:myron nassar    Reason patient is being transferred: md order    Transfer To: 505    Transfer via bed        Transported by pct    Order for Tele Monitor? No    Additional Lines: Cyr Catheter    Medicines sent: ns gtt      Patient belongings transferred with patient: Yes    Chart send with patient: Yes    Notified: spouse    Patient reassessed at: 1400 (date, time)  1  Upon arrival to floor: call bell in reach and bed in lowest position

## 2025-01-14 NOTE — NURSING
Pt arrived to floor via bed with ,transport IVF started, SCDs applied, oriented to room, call light placed within reach, bed low and locked, and wife at bedside. Pt complains of pain 4/10. Cyr noted draining clear yellow urine to gravity. Incisions noted to abdomen , CDI. No acute distress noted at this time.

## 2025-01-14 NOTE — H&P
[facility]    HPI:  Simeon Bishop is a 59 y.o. male with a right renal mass. He presents today for robotic partial nephrectomy.      ROS:  Neg except per HPI    Past Medical History:   Diagnosis Date    Disorder of kidney and ureter     Iritis     x 2     Kidney stones        Past Surgical History:   Procedure Laterality Date    COLONOSCOPY N/A 11/15/2016    Procedure: COLONOSCOPY-Miralax split prep;  Surgeon: Carlo Ward MD;  Location: [facility] ENDO;  Service: Endoscopy;  Laterality: N/A;    COLONOSCOPY N/A 1/10/2023    Procedure: COLONOSCOPY;  Surgeon: Guru Barker MD;  [facility]  Service: Endoscopy;  Laterality: N/A;  instructions sent to [facility]  pre call done    EYE SURGERY         Social History     Socioeconomic History    Marital status:    Tobacco Use    Smoking status: Former    Smokeless tobacco: Never   Substance and Sexual Activity    Alcohol use: Yes     Alcohol/week: 2.0 standard drinks of alcohol     Types: 2 Cans of beer per week    Drug use: No     Comment: 1 week ago    Sexual activity: Yes     Partners: Female     Social Drivers of Health     Financial Resource Strain: Patient Declined (3/4/2024)    Overall Financial Resource Strain (CARDIA)     Difficulty of Paying Living Expenses: Patient declined   Food Insecurity: Patient Declined (3/4/2024)    Hunger Vital Sign     Worried About Running Out of Food in the Last Year: Patient declined     Ran Out of Food in the Last Year: Patient declined   Transportation Needs: No Transportation Needs (3/4/2024)    PRAPARE - Transportation     Lack of Transportation (Medical): No     Lack of Transportation (Non-Medical): No   Physical Activity: Unknown (3/4/2024)    Exercise Vital Sign     Days of Exercise per Week: 7 days   Stress: Stress Concern Present (3/4/2024)    [employer] - Occupational Stress Questionnaire     Feeling of Stress : To some extent    Housing Stability: Patient Declined (3/4/2024)    Housing Stability Vital Sign     Unable to Pay for Housing in the Last Year: Patient declined     Unstable Housing in the Last Year: Patient declined       Family History   Problem Relation Name Age of Onset    Heart disease Mother      Colon cancer Father  70    Cancer Father      Liver cancer Father      Alcohol abuse Father      Prostate cancer Neg Hx      Heart attacks under age 50 Neg Hx         Review of patient's allergies indicates:  No Known Allergies    No current facility-administered medications on file prior to encounter.     Current Outpatient Medications on File Prior to Encounter   Medication Sig Dispense Refill    rosuvastatin (CRESTOR) 10 MG tablet Take 1 tablet (10 mg total) by mouth once daily. 90 tablet 3    cyclobenzaprine (FLEXERIL) 5 MG tablet Take 1 tablet (5 mg total) by mouth nightly. 30 tablet 1    famotidine (PEPCID) 20 MG tablet Take 1 tablet (20 mg total) by mouth 2 (two) times daily. 20 tablet 0       Anticoagulation:  No    Physical Exam  Constitutional:       General: He is not in acute distress.     Appearance: Normal appearance. He is not ill-appearing.   HENT:      Head: Normocephalic and atraumatic.      Mouth/Throat:      Mouth: Mucous membranes are moist.   Eyes:      Extraocular Movements: Extraocular movements intact.   Cardiovascular:      Rate and Rhythm: Normal rate.   Pulmonary:      Effort: Pulmonary effort is normal.   Abdominal:      Palpations: Abdomen is soft.   Skin:     General: Skin is warm and dry.   Neurological:      Mental Status: He is alert and oriented to person, place, and time.   Psychiatric:         Mood and Affect: Mood normal.         Behavior: Behavior normal.           Labs:      Lab Results   Component Value Date    WBC 12.56 11/11/2024    HGB 15.7 11/11/2024    HCT 46 11/11/2024    MCV 87 11/11/2024     11/11/2024           BMP  Lab Results   Component Value Date     11/11/2024    K  3.8 11/11/2024     11/11/2024    CO2 19 (L) 11/11/2024    BUN 17 11/11/2024    CREATININE 1.3 11/11/2024    CALCIUM 9.8 11/11/2024    ANIONGAP 15 11/11/2024    EGFRNORACEVR >60.0 11/11/2024         Assessment: Simeon Bishop is a 59 y.o. male with a right renal mass.    Plan:     1. To OR for right robotic partial nephrectomy, possible open, possible radical.  2. Consents signed   3. I have explained the risk, benefits, and alternatives of the procedure in detail. The patient voices understanding and all questions have been answered. The patient agrees to proceed as planned.     We then discussed risks, benefits, alternatives associated with this surgery, including complications, detailed below:  -Expectations:  We discussed that the procedure typically takes 2-4 hours, will be performed robotically, and she would typically spend 1-2 nights in the hospital with hopeful discharge on postoperative day 1.  We discussed possible conversion to open and possible radical nephrectomy should it be needed.  -Complications of General anesthesia:  Associated risks include pneumonia, cerebrovascular accident, cardiac events including myocardial infarction, pulmonary embolism, deep vein thrombosis  -Complications of Right Robot Assisted Partial Nephrectomy:  Infection of the superficial and deep spaces, skin infection, bleeding requiring transfusion, injury to the surrounding structures, which on the right side includes the liver, gallbladder, colon, small intestine, duodenum, and the major vascular structures of the abdomen and pelvis.  We discussed conversion to open, incisional hernia, incisional/fascial dehiscence, ileus, and bowel obstruction.  We also discussed the very rare occurrence of intraoperative or perioperative mortality.  Specific to partial nephrectomy, we discussed urine leak, positive surgical margin, and delayed bleeding or pseudoaneurysm requiring interventional radiology embolization, as well as  the need for total or radical nephrectomy and conversion to open        Moe Gilliam MD

## 2025-01-15 VITALS
OXYGEN SATURATION: 96 % | RESPIRATION RATE: 17 BRPM | SYSTOLIC BLOOD PRESSURE: 121 MMHG | TEMPERATURE: 99 F | DIASTOLIC BLOOD PRESSURE: 70 MMHG | BODY MASS INDEX: 26.1 KG/M2 | HEIGHT: 72 IN | WEIGHT: 192.69 LBS | HEART RATE: 71 BPM

## 2025-01-15 LAB
ANION GAP SERPL CALC-SCNC: 8 MMOL/L (ref 8–16)
BASOPHILS # BLD AUTO: 0.02 K/UL (ref 0–0.2)
BASOPHILS NFR BLD: 0.1 % (ref 0–1.9)
BODY FLUID SOURCE, CREATININE: NORMAL
BUN SERPL-MCNC: 25 MG/DL (ref 8–23)
CALCIUM SERPL-MCNC: 8 MG/DL (ref 8.7–10.5)
CHLORIDE SERPL-SCNC: 110 MMOL/L (ref 95–110)
CO2 SERPL-SCNC: 20 MMOL/L (ref 23–29)
CREAT FLD-MCNC: 1.6 MG/DL
CREAT SERPL-MCNC: 1.8 MG/DL (ref 0.5–1.4)
DIFFERENTIAL METHOD BLD: ABNORMAL
EOSINOPHIL # BLD AUTO: 0 K/UL (ref 0–0.5)
EOSINOPHIL NFR BLD: 0 % (ref 0–8)
ERYTHROCYTE [DISTWIDTH] IN BLOOD BY AUTOMATED COUNT: 11.9 % (ref 11.5–14.5)
EST. GFR  (NO RACE VARIABLE): 42.3 ML/MIN/1.73 M^2
GLUCOSE SERPL-MCNC: 116 MG/DL (ref 70–110)
HCT VFR BLD AUTO: 33.9 % (ref 40–54)
HGB BLD-MCNC: 11.5 G/DL (ref 14–18)
IMM GRANULOCYTES # BLD AUTO: 0.07 K/UL (ref 0–0.04)
IMM GRANULOCYTES NFR BLD AUTO: 0.5 % (ref 0–0.5)
LYMPHOCYTES # BLD AUTO: 1.6 K/UL (ref 1–4.8)
LYMPHOCYTES NFR BLD: 12 % (ref 18–48)
MAGNESIUM SERPL-MCNC: 2 MG/DL (ref 1.6–2.6)
MCH RBC QN AUTO: 30.3 PG (ref 27–31)
MCHC RBC AUTO-ENTMCNC: 33.9 G/DL (ref 32–36)
MCV RBC AUTO: 89 FL (ref 82–98)
MONOCYTES # BLD AUTO: 0.9 K/UL (ref 0.3–1)
MONOCYTES NFR BLD: 6.4 % (ref 4–15)
NEUTROPHILS # BLD AUTO: 11 K/UL (ref 1.8–7.7)
NEUTROPHILS NFR BLD: 81 % (ref 38–73)
NRBC BLD-RTO: 0 /100 WBC
PHOSPHATE SERPL-MCNC: 4 MG/DL (ref 2.7–4.5)
PLATELET # BLD AUTO: 192 K/UL (ref 150–450)
PMV BLD AUTO: 8.9 FL (ref 9.2–12.9)
POTASSIUM SERPL-SCNC: 4 MMOL/L (ref 3.5–5.1)
RBC # BLD AUTO: 3.79 M/UL (ref 4.6–6.2)
SODIUM SERPL-SCNC: 138 MMOL/L (ref 136–145)
WBC # BLD AUTO: 13.53 K/UL (ref 3.9–12.7)

## 2025-01-15 PROCEDURE — 80048 BASIC METABOLIC PNL TOTAL CA: CPT | Performed by: STUDENT IN AN ORGANIZED HEALTH CARE EDUCATION/TRAINING PROGRAM

## 2025-01-15 PROCEDURE — 82570 ASSAY OF URINE CREATININE: CPT | Performed by: STUDENT IN AN ORGANIZED HEALTH CARE EDUCATION/TRAINING PROGRAM

## 2025-01-15 PROCEDURE — 25000003 PHARM REV CODE 250: Performed by: STUDENT IN AN ORGANIZED HEALTH CARE EDUCATION/TRAINING PROGRAM

## 2025-01-15 PROCEDURE — 85025 COMPLETE CBC W/AUTO DIFF WBC: CPT | Performed by: STUDENT IN AN ORGANIZED HEALTH CARE EDUCATION/TRAINING PROGRAM

## 2025-01-15 PROCEDURE — 84100 ASSAY OF PHOSPHORUS: CPT | Performed by: STUDENT IN AN ORGANIZED HEALTH CARE EDUCATION/TRAINING PROGRAM

## 2025-01-15 PROCEDURE — 36415 COLL VENOUS BLD VENIPUNCTURE: CPT | Performed by: STUDENT IN AN ORGANIZED HEALTH CARE EDUCATION/TRAINING PROGRAM

## 2025-01-15 PROCEDURE — 83735 ASSAY OF MAGNESIUM: CPT | Performed by: STUDENT IN AN ORGANIZED HEALTH CARE EDUCATION/TRAINING PROGRAM

## 2025-01-15 PROCEDURE — 63600175 PHARM REV CODE 636 W HCPCS: Performed by: STUDENT IN AN ORGANIZED HEALTH CARE EDUCATION/TRAINING PROGRAM

## 2025-01-15 RX ORDER — FAMOTIDINE 20 MG/1
20 TABLET, FILM COATED ORAL DAILY
Status: DISCONTINUED | OUTPATIENT
Start: 2025-01-16 | End: 2025-01-15 | Stop reason: HOSPADM

## 2025-01-15 RX ADMIN — ACETAMINOPHEN 1000 MG: 500 TABLET ORAL at 06:01

## 2025-01-15 RX ADMIN — KETOROLAC TROMETHAMINE 15 MG: 30 INJECTION, SOLUTION INTRAMUSCULAR; INTRAVENOUS at 01:01

## 2025-01-15 RX ADMIN — ACETAMINOPHEN 1000 MG: 500 TABLET ORAL at 01:01

## 2025-01-15 RX ADMIN — SODIUM CHLORIDE: 9 INJECTION, SOLUTION INTRAVENOUS at 04:01

## 2025-01-15 RX ADMIN — ACETAMINOPHEN 1000 MG: 500 TABLET ORAL at 11:01

## 2025-01-15 RX ADMIN — KETOROLAC TROMETHAMINE 15 MG: 30 INJECTION, SOLUTION INTRAMUSCULAR; INTRAVENOUS at 06:01

## 2025-01-15 NOTE — DISCHARGE INSTRUCTIONS
What to expect with your Partial Nephrectomy.  [facility]  After surgery  You may or may not have a drain that is shaped like a grenade and put to suction  This drain usually may or may not come out on Post op day 1. If you go home with a drain, the nurses will teach you how to record the output and you will come back 3-5 days after you leave to have the drain removed in clinic.  You will be on bedrest overnight. The next morning we will come by and see you and take you off bedrest sometime mid morning, that is when your catheter will come out.   You will have a catheter after your surgery. It should come out the next day and you should be able to void on your own before you leave. If you are a male and on a BPH medicine, we will need to make sure you restart that the night of your surgery.  The night of surgery we expect and hope that you will:  Eat - you do not have to eat a whole meal, but we want to make sure you can tolerate liquid and/or solid food without nausea and vomiting  Use your incentive spirometer - this is the breathing apparatus that helps you expand your lungs. If and when you have pain you will not want to take deep breaths. But if you dont take deep breaths, you are at risk for pneumonia. The incentive spirometer will help prevent this from occurring by expanding your lungs.  Symptoms you may experience immediately post-op:  Bloating and/or shoulder pain if you had a laparascopic procedure- when we do this operation, we fill up your abdominal cavity with gas to better help us visualize the organs and allow our instruments to fit. After the surgery, not all the air can be removed and your body will eventually absorb this small amount of air. However this can make you feel bloated. In addition, when you sit up, the air can sit right under a muscle (the diaphragm) which has connecting nerves to the shoulders, which could explain why you have shoulder pain.  Do not expect necessarily to have  gas or to have a bowel movement - this goes along with the bloating, you may feel like you want to pass gas or have a bowel movement but you cant. This is normal and you will feel like this for a couple days. There are no pills to help with this. Small walks throughout the day should help with this.  Pain - your pain should be able to be controlled with medicines by mouth that we prescribe. It is important for you to tell us if you are on any pain medications at home before the surgery as you may need stronger pain meds while in the hospital.  You can go home when:  Pain is controlled with medicines by mouth  You are able to walk without difficulty or pain  You are tolerating a regulating diet  Your are voiding. If you cannot void we may have to replace a catheter and you will follow up 3-5 days after you leave to have a voiding trial. The nurses will teach you how to care for your catheter.  When you go home:  Blood pressure  Your blood pressure must be well controlled (<140 systolic blood pressure), if your blood pressure is not controlled, there is an increased risk of bleeding.  Activity  Continue to walk - small walks throughout the day are better than one long walk.   Do not lift anything greater than 8 pounds for 6 weeks - we want your abdominal wall muscles to heal.  Bowel Movements - Do not strain to have a bowel movement - the pain medicines will make you constipated. That is why we also ask you to take colace 2-3 x per day to help keep your bowels regular. If you are still having trouble, then you can also add Miralax once a day. Do not take any stool softeners if you are having diarrhea.  Drain - If you have a drain (not your catheter, but a separate drain) then record the output and bring it with you to your next appointment  Smoking - If you smoke, we encourage you to STOP. Smoking interferes with the healing process and your prolong your healing with continued smoking.  Driving - Do not drive while  you are on pain meds or with your catheter in place.  Bathing - If you do not have a drain, you can shower 48 hours after your surgery. If you do have a drain, sponge bathe only until the drain is out.  Dressing - you can remove the dressings if there is no drainage or change them as needed if there is. The little sterile band-aid strips will fall off on their own in 10-14 days. If they have not fallen off then you can remove them yourself.  Restarting medicines -especially blood thinners (Aspirin, Plavix, Coumadin), Fish Oil. Discuss this with your physician prior to discharge.  When to return to the ER  Fever - If you have a fever >101.5. This could be due to a number of reasons such as infection of the urine or incision. If your catheter has been removed, you could possibly have a leak. It would be best to come to the ER so they can better evaluate you.  Severe pain - pain is expected, but severe or new onset of pain is not normal.   Inability to tolerate food or liquid with nausea and vomiting - it would be best to go to the ER for them to better evaluate you.     Postoperative General Instructions    What to Expect:  It is normal to experience pain and swelling at the surgical site.  The pain usually decreases significantly after the first week but may last for many weeks.  Each day, the pain should be similar or better to the previous day. If it worsens, call the doctor's office.     Activity:  You should walk beginning on the day of surgery and increase activity slowly over the next two to four weeks.  Do not drive while taking prescription pain medication.  You may return to work when you feel ready.  Do not lift anything heavier than 10 lbs for 6 weeks.     Wound Care:  You may shower. Let soap and water run over the incisions and pat dry. Do not submerge in a bathtub or pool.  If you have an open wound, you should change the dressing twice daily with moist gauze.     Diet:  You may resume your normal diet  postoperatively.  Take a stool softener or laxative to avoid constipation.     Medication:  Pain Control  Take acetaminophen (Tylenol) 650 mg 4 times daily as needed for pain.  Take the prescribed oxycodone as needed if you have pain that is not controlled by acetaminophen.  Bowel Regularity  Take an over-the-counter stool softener/laxative (Colace, Miralax, or Milk of Magnesia) to avoid constipation.  Previous Home Medication  Restart your previous home medication unless otherwise instructed.    Call Your Doctor's Office If You Experience:  Fevers greater than 101.3°F.  Vomiting.  Spreading redness or drainage from the incisions.  Opening of the incisions.  Worsening pain not controlled by medication.  Chest pain or shortness of breath.    Follow-Up:  Follow-up with your surgeon as scheduled in 2 weeks.  If no follow-up appointment has been scheduled, call to schedule an appointment within 1-2 weeks of discharge.

## 2025-01-15 NOTE — HPI
Simeon Bishop is a 59 y.o. male with history of hyperlipidemia, nephrolithiasis, and right renal mass suspicious for renal cell carcinoma.

## 2025-01-15 NOTE — PLAN OF CARE
Problem: Adult Inpatient Plan of Care  Goal: Plan of Care Review  Outcome: Met  Goal: Patient-Specific Goal (Individualized)  Outcome: Met  Goal: Absence of Hospital-Acquired Illness or Injury  Outcome: Met  Goal: Optimal Comfort and Wellbeing  Outcome: Met  Goal: Readiness for Transition of Care  Outcome: Met     Problem: Wound  Goal: Optimal Coping  Outcome: Met  Goal: Optimal Functional Ability  Outcome: Met  Goal: Absence of Infection Signs and Symptoms  Outcome: Met  Goal: Improved Oral Intake  Outcome: Met  Goal: Optimal Pain Control and Function  Outcome: Met  Goal: Skin Health and Integrity  Outcome: Met  Goal: Optimal Wound Healing  Outcome: Met     Problem: Infection  Goal: Absence of Infection Signs and Symptoms  Outcome: Met     Problem: Fall Injury Risk  Goal: Absence of Fall and Fall-Related Injury  Outcome: Met   Patient discharging home , discharge instructions reviewed in detail with patient, allowed time for questions, all questions answered, IV removed, gauze and tape applied, bleeding controlled, discharge medications have been delivered to bedside, awaiting transportation .

## 2025-01-15 NOTE — PLAN OF CARE
[facility]  Initial Discharge Assessment       Primary Care Provider: Marguerite Barrera MD    Admission Diagnosis: Renal mass [N28.89]  Renal mass, right [N28.89]    Admission Date: 1/14/2025  Expected Discharge Date: 1/15/2025         Payor: BLUE CROSS BLUE SHIELD / Plan: BCBS ALL OUT OF STATE / Product Type: PPO /     Extended Emergency Contact Information  Primary Emergency Contact: EdnaMeagan  Address: [address] [place] [address] [facility]  Home Phone: [phone]  Mobile Phone: [phone]  Relation: Spouse    Discharge Plan A: Home with family         [facility] [place] [facility] [address]  [address] [facility]  [place] [address]  Phone: [phone] Fax: [phone]      Initial Assessment (most recent)       Adult Discharge Assessment - 01/15/25 1152          Discharge Assessment    Assessment Type Discharge Planning Assessment     Confirmed/corrected address, phone number and insurance Yes     Confirmed Demographics Correct on Facesheet     Source of Information patient     Does patient/caregiver understand observation status Yes     Communicated AGUSTIN with patient/caregiver Yes     People in Home spouse     Facility Arrived From: Home     Do you expect to return to your current living situation? Yes     Do you have help at home or someone to help you manage your care at home? Yes     Who are your caregiver(s) and their phone number(s)? Meagan Bishop (Spouse) [phone]     Prior to hospitilization cognitive status: Alert/Oriented     Current cognitive status: Alert/Oriented     Walking or Climbing Stairs Difficulty no     Dressing/Bathing Difficulty no     Equipment Currently Used at Home none     Readmission within 30 days? No     Patient currently being followed by outpatient case management? No     Do you currently have service(s) that help you manage your care at home? No     Do you take prescription medications? Yes     Do you have  prescription coverage? Yes     Do you have any problems affording any of your prescribed medications? No     Is the patient taking medications as prescribed? yes     Who is going to help you get home at discharge? Meagan Bishop (Spouse) [phone]     How do you get to doctors appointments? family or friend will provide     Are you on dialysis? No     Do you take coumadin? No     Discharge Plan A Home with family        Physical Activity    On average, how many days per week do you engage in moderate to strenuous exercise (like a brisk walk)? 0 days     On average, how many minutes do you engage in exercise at this level? 0 min        Financial Resource Strain    How hard is it for you to pay for the very basics like food, housing, medical care, and heating? Not hard at all        Housing Stability    In the last 12 months, was there a time when you were not able to pay the mortgage or rent on time? No     At any time in the past 12 months, were you homeless or living in a shelter (including now)? No        Transportation Needs    Has the lack of transportation kept you from medical appointments, meetings, work or from getting things needed for daily living? No        Food Insecurity    Within the past 12 months, you worried that your food would run out before you got the money to buy more. Never true     Within the past 12 months, the food you bought just didn't last and you didn't have money to get more. Never true        Stress    Do you feel stress - tense, restless, nervous, or anxious, or unable to sleep at night because your mind is troubled all the time - these days? Not at all        Social Isolation    How often do you feel lonely or isolated from those around you?  Never        Alcohol Use    Q1: How often do you have a drink containing alcohol? Never     Q2: How many drinks containing alcohol do you have on a typical day when you are drinking? Patient does not drink     Q3: How often do you have six or  more drinks on one occasion? Never        Utilities    In the past 12 months has the electric, gas, oil, or water company threatened to shut off services in your home? No        Health Literacy    How often do you need to have someone help you when you read instructions, pamphlets, or other written material from your doctor or pharmacy? Never                        Spoke with patient at bedside to complete d/c planning assessment. Patient lives with his wife in a single story home with one step to enter. Independent with ADL's. No DME in home. Verified PCP, pharmacy and health insurance. Patient will have help at home per wife. Plan for discharge later this afternoon. Discharge Plan A and Plan B have been determined by review of patient's clinical status, future medical and therapeutic needs, and coverage/benefits for post-acute care in coordination with multidisciplinary team members.      Chanda SANTOS  Case Management  [facility]  [phone]

## 2025-01-15 NOTE — PLAN OF CARE
[facility] - [facility]  Discharge Final Note    Primary Care Provider: Marguerite Barrera MD    Expected Discharge Date: 1/15/2025    Final Discharge Note (most recent)       Final Note - 01/15/25 1437          Final Note    Assessment Type Final Discharge Note     Anticipated Discharge Disposition Home or Self Care     What phone number can be called within the next 1-3 days to see how you are doing after discharge? --   [phone]                    Important Message from Medicare             Contact Info       Mario Alberto Liriano MD   Specialty: Urology    [facility] [facility]  5th Floor  [facility] [address]   Phone: [phone]       Next Steps: Follow up on 2/3/2025    Instructions: For post operative follow up and review of labs  (Please ensure to complete your labwork prior to office visit)            Future Appointments   Date Time Provider Department Center   1/29/2025 11:30 AM LAB, [place] [facility] [place]   2/3/2025 12:30 PM Mario Alberto Liriano MD [facility] [place]     Patient discharged home to care of family on 1/15/25.    Chanda Sheth RNCM  Case Management  [facility]  [phone]

## 2025-01-15 NOTE — PROGRESS NOTES
[facility]  Urology  Progress Note    Patient Name: Simeon Bishop  MRN: [id number]  Admission Date: 1/14/2025  Hospital Length of Stay: 1 days  Code Status: Prior   Attending Provider: Mario Alberto Liriano MD   Primary Care Physician: Marguerite Barrera MD    Subjective:     HPI:  Simeon Bishop is a 59 y.o. male with history of hyperlipidemia, nephrolithiasis, and right renal mass suspicious for renal cell carcinoma.     Interval History: No acute overnight events. Pain controlled. Ate small meal, which he tolerated appropriately. Ambulated in room, but did not walk much as his left foot felt numb. Feels improved this AM. No hip pain.    Review of Systems see HPI above  Objective:     Temp:  [97.9 °F (36.6 °C)-99 °F (37.2 °C)] 98.3 °F (36.8 °C)  Pulse:  [] 70  Resp:  [12-20] 18  SpO2:  [93 %-100 %] 93 %  BP: ()/(55-93) 110/67     Body mass index is 26.13 kg/m².       Drains       Drain  Duration                  Closed/Suction Drain 01/14/25 1027 Tube - 1 Lateral RUQ Bulb 15 Fr. <1 day         Urethral Catheter 01/14/25 0630 Non-latex;Straight-tip 16 Fr. <1 day                     Physical Exam  Vitals reviewed.   Constitutional:       General: He is awake. He is not in acute distress.  Cardiovascular:      Rate and Rhythm: Normal rate.   Pulmonary:      Effort: Pulmonary effort is normal. No respiratory distress.   Abdominal:      Comments: Abdomen soft, non-distended, appropriately tender to palpation. Laparoscopic incisions healing appropriately, c/d/I. LUIS F SS.    Genitourinary:     Comments: Cyr CYU  Neurological:      General: No focal deficit present.      Mental Status: He is alert and oriented to person, place, and time.      Comments: Appropriate left lower leg motor and sensory function         Significant Labs:    BMP:  Recent Labs   Lab 01/14/25  1120 01/14/25  1623 01/15/25  0519    139 138   K 5.6* 4.3  4.3 4.0    108 110   CO2 17* 22* 20*   BUN 22 24* 25*    CREATININE 1.5* 1.4 1.8*   CALCIUM 8.1* 8.4* 8.0*     CBC:   Recent Labs   Lab 01/14/25  1120 01/15/25  0519   WBC 12.81* 13.53*   HGB 13.2* 11.5*   HCT 39.0* 33.9*    192       Recent Lab Results  (Last 5 results in the past 24 hours)        01/15/25  0519   01/15/25  0402   01/14/25  1626   01/14/25  1623   01/14/25  1120        Anion Gap 8       9   12       Baso # 0.02         0.04       Basophil % 0.1         0.3       Body Fluid Source, Creatinine   LUIS F Drainage             BUN 25       24   22       Calcium 8.0       8.4   8.1       Chloride 110       108   108       CO2 20       22   17       Creatinine 1.8       1.4   1.5       Creatinine, Body Fluid   1.6  Comment: Reference intervals have not been established for body fluids.    Comparison of this result with the concentration in the blood,   serum,or plasma is recommended.  The reference interval for creatinine in serum/plasma is   0.5-1.4 mg/dL. Please interpret in context with the clinical  picture.               Differential Method Automated         Automated       eGFR 42.3       57.2   52.6       Eos # 0.0         0.0       Eos % 0.0         0.2       Glucose 116       139   158       Gran # (ANC) 11.0         11.4       Gran % 81.0         88.9       Hematocrit 33.9         39.0       Hemoglobin 11.5         13.2       Immature Grans (Abs) 0.07  Comment: Mild elevation in immature granulocytes is non specific and   can be seen in a variety of conditions including stress response,   acute inflammation, trauma and pregnancy. Correlation with other   laboratory and clinical findings is essential.           0.08  Comment: Mild elevation in immature granulocytes is non specific and   can be seen in a variety of conditions including stress response,   acute inflammation, trauma and pregnancy. Correlation with other   laboratory and clinical findings is essential.         Immature Granulocytes 0.5         0.6       Lymph # 1.6         1.0        Lymph % 12.0         8.0       Magnesium  2.0         2.1       MCH 30.3         30.4       MCHC 33.9         33.8       MCV 89         90       Mono # 0.9         0.3       Mono % 6.4         2.0       MPV 8.9         8.9       nRBC 0         0       Phosphorus Level 4.0         3.6       Platelet Count 192         221       POCT Glucose     141           Potassium 4.0       4.3   5.6  Comment: *Result may be interfered by visible hemolysis              4.3         RBC 3.79         4.34       RDW 11.9         11.9       Sodium 138       139   137       WBC 13.53         12.81                              Significant Imaging:  All pertinent imaging results/findings from the past 24 hours have been reviewed.      Assessment/Plan:     * Renal mass, right  Simeon Bishop is a 59 y.o. male with history of hyperlipidemia, nephrolithiasis, and right renal mass suspicious for renal cell carcinoma. S/p robotic right partial nephrectomy on 1/14 with Dr Liriano.    - Patient seen and examined  - Patient reports some left foot numbness - appropriate motor and sensory function, will likely resolve with time  - Patient ambulated briefly yesterday, needs to ambulate again this morning prior to dc  - LUIS F drain Cr this AM - appropriate, will remove prior to dc  - Cyr catheter with CYU this AM - appropriate, will remove prior to dc. Must pass void trial prior to dc  - Tolerating regular diet  - Patient with appropriate pain control    Dispo: must ambulate and pass voiding trial prior to dc, dc home today        VTE Risk Mitigation (From admission, onward)      None          Pretty Smith,   Urology  [facility]

## 2025-01-15 NOTE — PROGRESS NOTES
Pharmacist Renal Dose Adjustment Note    Simeon Bishop is a 59 y.o. male being treated with the medication famotidine    Patient Data:    Vital Signs (Most Recent):  Temp: 97.9 °F (36.6 °C) (01/15/25 0744)  Pulse: 84 (01/15/25 0744)  Resp: 17 (01/15/25 0744)  BP: 111/69 (01/15/25 0744)  SpO2: 98 % (01/15/25 0744) Vital Signs (72h Range):  Temp:  [97.9 °F (36.6 °C)-99 °F (37.2 °C)]   Pulse:  []   Resp:  [12-20]   BP: ()/(55-93)   SpO2:  [93 %-100 %]      Recent Labs   Lab 01/14/25  1120 01/14/25  1623 01/15/25  0519   CREATININE 1.5* 1.4 1.8*     Serum creatinine: 1.8 mg/dL (H) 01/15/25 0519  Estimated creatinine clearance: 47.3 mL/min (A)    Medication: famotidine 20 mg q12h will be changed to famotidine 20 mg q24h     Pharmacist's Name: Ana Wilson  Pharmacist's Extension: [id number]

## 2025-01-15 NOTE — DISCHARGE SUMMARY
[facility] - Surgery  Urology  Discharge Summary      Patient Name: Simeon Bishop  MRN: [id number]  Admission Date: 1/14/2025  Hospital Length of Stay: 1 days  Discharge Date and Time:  01/15/2025 6:40 AM  Attending Physician: Mario Alberto Liriano MD  Discharging Provider: Pretty Smith MD  Primary Care Physician: Marguerite Barrera MD    HPI:   Simeon Bishop is a 59 y.o. male with history of hyperlipidemia, nephrolithiasis, and right renal mass suspicious for renal cell carcinoma.     Procedure(s) (LRB):  XI ROBOTIC NEPHRECTOMY, PARTIAL (Right)     Indwelling Lines/Drains at time of discharge:   Lines/Drains/Airways       Drain  Duration                  Urethral Catheter 01/14/25 0630 Non-latex;Straight-tip 16 Fr. 1 day         Closed/Suction Drain 01/14/25 1027 Tube - 1 Lateral RUQ Bulb 15 Fr. <1 day                    Hospital Course (synopsis of major diagnoses, care, treatment, and services provided during the course of the hospital stay): The patient was admitted to [facility] for the above procedure. Patient tolerated the procedure well in its entirety without issue. For more details, please refer to the complete operative note. he was transferred to recovery post-op and then to the floor. Once on the floor his diet was advanced and he ambulated the night of and day after surgery. On POD 1 the patient was tolerating a regular diet, ambulating without difficulty.His pain was well controlled.    Physical exam was appropriate for post operative state. The incisions were clean, dry and intact. The parry was draining clear yellow urine. The parry was removed and he was able to void without difficulty. LUIS F drain output was serosanguinous and LUIS F creatinine was 1.6. This was removed prior to discharge. The patient was deemed stable for discharge on 01/15/2025.    Goals of Care Treatment Preferences:  Code Status: Full Code    Consults:     Significant Diagnostic Studies:     Pending Diagnostic Studies:       Procedure Component  Value Units Date/Time    Specimen to Pathology, Surgery General Surgery [[id number]] Collected: 01/14/25 1042    Order Status: Sent Lab Status: In process Updated: 01/14/25 1421    Specimen: Tissue             Final Active Diagnoses:    Diagnosis Date Noted POA    PRINCIPAL PROBLEM:  Renal mass, right [N28.89] 01/14/2025 Yes      Problems Resolved During this Admission:         Discharged Condition: good    Disposition:     Follow Up:   Follow-up Information       Mario Alberto Liriano MD Follow up in 2 week(s).    Specialty: Urology  Why: For post operative follow up  Contact information:  [address] [place] [address]  5th Floor  [facility] [address]  [phone]                           Patient Instructions:      Basic metabolic panel   Standing Status: Future Standing Exp. Date: 04/15/26   Order Comments: Please complete prior to post-op follow up.     Order Specific Question Answer Comments   Send normal result to authorizing provider's In Basket if patient is active on MyChart: Yes      Lifting restrictions   Order Comments: No lifting more than 10 lbs for 6 weeks post op.     No driving until:   Order Comments: No driving until no longer taking narcotic pain medications.     Notify your health care provider if you experience any of the following:  temperature >100.4     Notify your health care provider if you experience any of the following:  persistent nausea and vomiting or diarrhea     Notify your health care provider if you experience any of the following:  severe uncontrolled pain     Notify your health care provider if you experience any of the following:  redness, tenderness, or signs of infection (pain, swelling, redness, odor or green/yellow discharge around incision site)     Notify your health care provider if you experience any of the following:  increased confusion or weakness     Lifting restrictions   Order Comments: No lifting more than 10 lbs for 6 weeks post op.     No driving until:   Order Comments: No  driving until no longer taking narcotic pain medications.     Notify your health care provider if you experience any of the following:  temperature >100.4     Notify your health care provider if you experience any of the following:  persistent nausea and vomiting or diarrhea     Notify your health care provider if you experience any of the following:  severe uncontrolled pain     Notify your health care provider if you experience any of the following:  redness, tenderness, or signs of infection (pain, swelling, redness, odor or green/yellow discharge around incision site)     Notify your health care provider if you experience any of the following:  increased confusion or weakness     Type & Screen   Standing Status: Future Number of Occurrences: 1 Standing Exp. Date: 03/03/26     Medications:  Reconciled Home Medications:      Medication List        START taking these medications      acetaminophen 500 MG tablet  Commonly known as: TYLENOL  Take 2 tablets (1,000 mg total) by mouth every 8 (eight) hours. for 14 days     oxyCODONE 5 MG immediate release tablet  Commonly known as: ROXICODONE  Take 1 tablet (5 mg total) by mouth every 4 (four) hours as needed for Pain (Pain not controlled by acetaminophen).     polyethylene glycol 17 gram/dose powder  Commonly known as: GLYCOLAX  Use cap to measure 17 grams.  Dissolve as directed and take by mouth once daily for 14 days.            CONTINUE taking these medications      cyclobenzaprine 5 MG tablet  Commonly known as: FLEXERIL  Take 1 tablet (5 mg total) by mouth nightly.     famotidine 20 MG tablet  Commonly known as: PEPCID  Take 1 tablet (20 mg total) by mouth 2 (two) times daily.     rosuvastatin 10 MG tablet  Commonly known as: CRESTOR  Take 1 tablet (10 mg total) by mouth once daily.              Time spent on the discharge of patient: 10 minutes    Pretty Smith DO  Urology  [facility] - Surgery

## 2025-01-15 NOTE — ASSESSMENT & PLAN NOTE
Simeon Bishop is a 59 y.o. male with history of hyperlipidemia, nephrolithiasis, and right renal mass suspicious for renal cell carcinoma. S/p robotic right partial nephrectomy on 1/14 with Dr Liriano.    - Patient seen and examined  - Patient reports some left foot numbness - appropriate motor and sensory function, will likely resolve with time  - Patient ambulated briefly yesterday, needs to ambulate again this morning prior to dc  - LUIS F drain Cr this AM - appropriate, will remove prior to dc  - Cyr catheter with CYU this AM - appropriate, will remove prior to dc. Must pass void trial prior to dc  - Tolerating regular diet  - Patient with appropriate pain control    Dispo: must ambulate and pass voiding trial prior to dc, dc home today

## 2025-01-15 NOTE — SUBJECTIVE & OBJECTIVE
Interval History: No acute overnight events. Pain controlled. Ate small meal, which he tolerated appropriately. Ambulated in room, but did not walk much as his left foot felt numb. Feels improved this AM. No hip pain.    Review of Systems see HPI above  Objective:     Temp:  [97.9 °F (36.6 °C)-99 °F (37.2 °C)] 98.3 °F (36.8 °C)  Pulse:  [] 70  Resp:  [12-20] 18  SpO2:  [93 %-100 %] 93 %  BP: ()/(55-93) 110/67     Body mass index is 26.13 kg/m².       Drains       Drain  Duration                  Closed/Suction Drain 01/14/25 1027 Tube - 1 Lateral RUQ Bulb 15 Fr. <1 day         Urethral Catheter 01/14/25 0630 Non-latex;Straight-tip 16 Fr. <1 day                     Physical Exam  Vitals reviewed.   Constitutional:       General: He is awake. He is not in acute distress.  Cardiovascular:      Rate and Rhythm: Normal rate.   Pulmonary:      Effort: Pulmonary effort is normal. No respiratory distress.   Abdominal:      Comments: Abdomen soft, non-distended, appropriately tender to palpation. Laparoscopic incisions healing appropriately, c/d/I. LUIS F SS.    Genitourinary:     Comments: Cyr CYU  Neurological:      General: No focal deficit present.      Mental Status: He is alert and oriented to person, place, and time.      Comments: Appropriate left lower leg motor and sensory function         Significant Labs:    BMP:  Recent Labs   Lab 01/14/25  1120 01/14/25  1623 01/15/25  0519    139 138   K 5.6* 4.3  4.3 4.0    108 110   CO2 17* 22* 20*   BUN 22 24* 25*   CREATININE 1.5* 1.4 1.8*   CALCIUM 8.1* 8.4* 8.0*     CBC:   Recent Labs   Lab 01/14/25  1120 01/15/25  0519   WBC 12.81* 13.53*   HGB 13.2* 11.5*   HCT 39.0* 33.9*    192       Recent Lab Results  (Last 5 results in the past 24 hours)        01/15/25  0519   01/15/25  0402   01/14/25  1626   01/14/25  1623   01/14/25  1120        Anion Gap 8       9   12       Baso # 0.02         0.04       Basophil % 0.1         0.3       Body  Fluid Source, Creatinine   LUIS F Drainage             BUN 25       24   22       Calcium 8.0       8.4   8.1       Chloride 110       108   108       CO2 20       22   17       Creatinine 1.8       1.4   1.5       Creatinine, Body Fluid   1.6  Comment: Reference intervals have not been established for body fluids.    Comparison of this result with the concentration in the blood,   serum,or plasma is recommended.  The reference interval for creatinine in serum/plasma is   0.5-1.4 mg/dL. Please interpret in context with the clinical  picture.               Differential Method Automated         Automated       eGFR 42.3       57.2   52.6       Eos # 0.0         0.0       Eos % 0.0         0.2       Glucose 116       139   158       Gran # (ANC) 11.0         11.4       Gran % 81.0         88.9       Hematocrit 33.9         39.0       Hemoglobin 11.5         13.2       Immature Grans (Abs) 0.07  Comment: Mild elevation in immature granulocytes is non specific and   can be seen in a variety of conditions including stress response,   acute inflammation, trauma and pregnancy. Correlation with other   laboratory and clinical findings is essential.           0.08  Comment: Mild elevation in immature granulocytes is non specific and   can be seen in a variety of conditions including stress response,   acute inflammation, trauma and pregnancy. Correlation with other   laboratory and clinical findings is essential.         Immature Granulocytes 0.5         0.6       Lymph # 1.6         1.0       Lymph % 12.0         8.0       Magnesium  2.0         2.1       MCH 30.3         30.4       MCHC 33.9         33.8       MCV 89         90       Mono # 0.9         0.3       Mono % 6.4         2.0       MPV 8.9         8.9       nRBC 0         0       Phosphorus Level 4.0         3.6       Platelet Count 192         221       POCT Glucose     141           Potassium 4.0       4.3   5.6  Comment: *Result may be interfered by visible  hemolysis              4.3         RBC 3.79         4.34       RDW 11.9         11.9       Sodium 138       139   137       WBC 13.53         12.81                              Significant Imaging:  All pertinent imaging results/findings from the past 24 hours have been reviewed.

## 2025-01-17 ENCOUNTER — PATIENT MESSAGE (OUTPATIENT)
Dept: ADMINISTRATIVE | Facility: CLINIC | Age: 60
End: 2025-01-17
Payer: COMMERCIAL

## 2025-01-17 ENCOUNTER — PATIENT OUTREACH (OUTPATIENT)
Dept: ADMINISTRATIVE | Facility: CLINIC | Age: 60
End: 2025-01-17
Payer: COMMERCIAL

## 2025-01-17 NOTE — PROGRESS NOTES
C3 nurse attempted to contact Simeon Bishop for a TCC post hospital discharge follow up call. No answer. No voicemail available.The patient does not have a scheduled HOSFU appointment. Message sent to PCP staff for assistance with scheduling visit with patient.

## 2025-01-21 NOTE — PROGRESS NOTES
C3 nurse spoke with Simeon Bishop for a TCC post hospital discharge follow up call. The patient does not have a scheduled HOSFU appointment with Marguerite Barrera MD within 5-7 days post hospital discharge date 01/15/25.  Declined Home NP and Declined C3 nurse to schedule HOSPFU visit in [facility]. Message routed to PCP for a HOSPFU with [facility] PCP within 5-7 days of d/c.  Instructed patient to call for HOSPFU. Stated understanding.

## 2025-01-29 ENCOUNTER — LAB VISIT (OUTPATIENT)
Dept: LAB | Facility: HOSPITAL | Age: 60
End: 2025-01-29
Payer: COMMERCIAL

## 2025-01-29 DIAGNOSIS — N28.89 RENAL MASS, RIGHT: ICD-10-CM

## 2025-01-29 LAB
ANION GAP SERPL CALC-SCNC: 10 MMOL/L (ref 8–16)
BASOPHILS # BLD AUTO: 0.07 K/UL (ref 0–0.2)
BASOPHILS NFR BLD: 0.7 % (ref 0–1.9)
BUN SERPL-MCNC: 22 MG/DL (ref 8–23)
CALCIUM SERPL-MCNC: 9.4 MG/DL (ref 8.7–10.5)
CHLORIDE SERPL-SCNC: 105 MMOL/L (ref 95–110)
CO2 SERPL-SCNC: 23 MMOL/L (ref 23–29)
CREAT SERPL-MCNC: 1.3 MG/DL (ref 0.5–1.4)
DIFFERENTIAL METHOD BLD: ABNORMAL
EOSINOPHIL # BLD AUTO: 0.5 K/UL (ref 0–0.5)
EOSINOPHIL NFR BLD: 4.4 % (ref 0–8)
ERYTHROCYTE [DISTWIDTH] IN BLOOD BY AUTOMATED COUNT: 11.7 % (ref 11.5–14.5)
EST. GFR  (NO RACE VARIABLE): >60 ML/MIN/1.73 M^2
GLUCOSE SERPL-MCNC: 93 MG/DL (ref 70–110)
HCT VFR BLD AUTO: 40.5 % (ref 40–54)
HGB BLD-MCNC: 13.2 G/DL (ref 14–18)
IMM GRANULOCYTES # BLD AUTO: 0.05 K/UL (ref 0–0.04)
IMM GRANULOCYTES NFR BLD AUTO: 0.5 % (ref 0–0.5)
LYMPHOCYTES # BLD AUTO: 2.3 K/UL (ref 1–4.8)
LYMPHOCYTES NFR BLD: 22.2 % (ref 18–48)
MCH RBC QN AUTO: 29.2 PG (ref 27–31)
MCHC RBC AUTO-ENTMCNC: 32.6 G/DL (ref 32–36)
MCV RBC AUTO: 90 FL (ref 82–98)
MONOCYTES # BLD AUTO: 0.7 K/UL (ref 0.3–1)
MONOCYTES NFR BLD: 6.7 % (ref 4–15)
NEUTROPHILS # BLD AUTO: 6.7 K/UL (ref 1.8–7.7)
NEUTROPHILS NFR BLD: 65.5 % (ref 38–73)
NRBC BLD-RTO: 0 /100 WBC
PLATELET # BLD AUTO: 400 K/UL (ref 150–450)
PMV BLD AUTO: 8.8 FL (ref 9.2–12.9)
POTASSIUM SERPL-SCNC: 3.9 MMOL/L (ref 3.5–5.1)
RBC # BLD AUTO: 4.52 M/UL (ref 4.6–6.2)
SODIUM SERPL-SCNC: 138 MMOL/L (ref 136–145)
WBC # BLD AUTO: 10.15 K/UL (ref 3.9–12.7)

## 2025-01-29 PROCEDURE — 36415 COLL VENOUS BLD VENIPUNCTURE: CPT | Mod: PO

## 2025-01-29 PROCEDURE — 80048 BASIC METABOLIC PNL TOTAL CA: CPT

## 2025-01-29 PROCEDURE — 85025 COMPLETE CBC W/AUTO DIFF WBC: CPT

## 2025-01-31 LAB
COMMENT: NORMAL
FINAL PATHOLOGIC DIAGNOSIS: NORMAL
GROSS: NORMAL
Lab: NORMAL

## 2025-02-02 NOTE — PROGRESS NOTES
History & Physical    SUBJECTIVE:     Chief Complaint: s/p robotic right partial nephrectomy    History of Present Illness:  Simeon Bishop is a 59 y.o. male with a history of 4.1cmx 3.9cm right renal mass who presents to clinic for follow-up s/p robotic right partial nephrectomy on 1/14/25.     Patient states he has been doing well since surgery. Incisions are c/d/I. He initially required oxycodone for the first few days after discharge but currently is just taking tylenol once every few days for pain. He has a good appetite and is eating a regular diet. States he is back to his regular activity level. Denies any n/v, fever or chills. Denies any LUTs and hematuria.     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  Carcinoma was present in the margin, we reached out to the pathologist regarding this given the enucleation nature of the resection.. Pathologic stage pT1a.     Cr on 1/29/25: 1.3 (baseline 1.2-1.3)  Hbg on 1/29/25: 13.2    Review of patient's allergies indicates:  No Known Allergies    Past Medical History:   Diagnosis Date    Disorder of kidney and ureter     Iritis     x 2     Kidney stones      Past Surgical History:   Procedure Laterality Date    COLONOSCOPY N/A 11/15/2016    Procedure: COLONOSCOPY-Miralax split prep;  Surgeon: Carlo Ward MD;  Location: [facility];  Service: Endoscopy;  Laterality: N/A;    COLONOSCOPY N/A 1/10/2023    Procedure: COLONOSCOPY;  Surgeon: Guru Barker MD;  Location: [facility] ([facility]);  Service: Endoscopy;  Laterality: N/A;  instructions sent to [facility]  pre call done    EYE SURGERY      ROBOT-ASSISTED LAPAROSCOPIC PARTIAL NEPHRECTOMY USING DA DINORAH XI Right 1/14/2025    Procedure: XI ROBOTIC NEPHRECTOMY, PARTIAL;  Surgeon: Mario Alberto Liriano MD;  Location: [address];  Service: Urology;  Laterality: Right;     Family History   Problem Relation Name Age of Onset    Heart disease Mother      Colon cancer  Father  70    Cancer Father      Liver cancer Father      Alcohol abuse Father      Prostate cancer Neg Hx      Heart attacks under age 50 Neg Hx       Social History     Tobacco Use    Smoking status: Former    Smokeless tobacco: Never   Substance Use Topics    Alcohol use: Yes     Alcohol/week: 2.0 standard drinks of alcohol     Types: 2 Cans of beer per week    Drug use: No     Comment: 1 week ago        Review of Systems:  Review of Systems   Constitutional:  Negative for activity change, appetite change, chills, fever and unexpected weight change.   Respiratory:  Negative for apnea, chest tightness, shortness of breath and wheezing.    Cardiovascular:  Negative for chest pain and leg swelling.   Gastrointestinal:  Negative for constipation, diarrhea, nausea and vomiting.   Genitourinary:  Negative for dysuria and hematuria.   Skin:  Negative for color change, pallor and rash.   Psychiatric/Behavioral:  The patient is not nervous/anxious.        OBJECTIVE:     Vital Signs (Most Recent):       Estimated body mass index is 24.85 kg/m² as calculated from the following:    Height as of this encounter: 6' (1.829 m).    Weight as of this encounter: 83.1 kg (183 lb 3.2 oz).    Physical Exam:  Physical Exam  Vitals and nursing note reviewed.   Constitutional:       General: He is not in acute distress.     Appearance: Normal appearance. He is normal weight. He is not ill-appearing or toxic-appearing.   Cardiovascular:      Rate and Rhythm: Normal rate.   Pulmonary:      Effort: Pulmonary effort is normal. No respiratory distress.      Breath sounds: No wheezing.   Abdominal:      General: Abdomen is flat. There is no distension.      Palpations: Abdomen is soft. There is no mass.      Tenderness: There is no abdominal tenderness. There is no guarding.      Hernia: No hernia is present.      Comments: Incisions c/d/i   Skin:     Findings: Bruising (mild bruising around lower right incision) present. No erythema or rash.    Neurological:      Mental Status: He is alert.         Significant Labs:  CBC:  Lab Results   Component Value Date    WBC 10.15 2025    HGB 13.2 (L) 2025    HCT 40.5 2025    MCV 90 2025     2025       BMP:  Lab Results   Component Value Date     2025    K 3.9 2025     2025    CO2 23 2025    BUN 22 2025    CREATININE 1.3 2025    CALCIUM 9.4 2025    ANIONGAP 10 2025    ESTGFRAFRICA >60.0 2021    EGFRNONAA >60.0 2021       Imagin24: CT a/p:  EXAMINATION:  CT ABDOMEN PELVIS WITH IV CONTRAST     CLINICAL HISTORY:  Abdominal pain, acute, nonlocalized;     TECHNIQUE:  Axial images of the abdomen and pelvis were acquired  after the use of 100 cc [id number] IV contrast.  Coronal and sagittal reconstructions were also obtained     COMPARISON:  CT abdomen 2008     FINDINGS:  Heart: Normal in size. No pericardial effusion. No significant calcific coronary atherosclerosis.     Lungs: Visualized portions of the lungs are clear.     Liver: Normal in size and contour.  Hypodense parenchyma within the area of the falciform ligament, likely fatty infiltration.  Subcentimeter hypodensity about the hepatic dome, similar to prior CT 2008.  Additional scattered hypodensities are too small to characterize and not well visualized on previous noncontrast imaging.     Gallbladder: No calcified gallstones.     Bile Ducts: No evidence of dilated ducts.     Pancreas: No mass or peripancreatic fat stranding.     Spleen: Normal size.  No focal lesions.  Likely tiny accessory splenule.     Stomach and duodenum: Significant submucosal edema of the stomach predominantly around the pylorus, which may be seen with gastritis.  Duodenum is unremarkable.     Adrenals: Unremarkable.     Kidneys/ Ureters: Normal in size and location. Normal enhancement. No hydronephrosis or nephrolithiasis. No ureteral dilatation.   Heterogenously enhancing, expansile mass within the lower pole of the right kidney measuring 4.1 x 3.9 cm, concerning for neoplastic process such as RCC.  The renal vein is unremarkable.  No surrounding lymphadenopathy.     Bladder: No evidence of wall thickening.     Reproductive organs: Prostate is unremarkable.     Bowel/Mesentery: Small bowel is normal in caliber with no evidence of obstruction. No evidence of inflammation or wall thickening.  Colon demonstrates no focal wall thickening.  Appendix is unremarkable.     Peritoneum: No intraperitoneal free air or fluid     Lymph nodes: Subcentimeter perigastric nodes.  Scattered para-aortic and pericaval subcentimeter nodes.  No enlarged nodes by CT criteria.     Vasculature: No aneurysm. No significant calcific atherosclerosis.     Abdominal wall:  Bilateral fat containing inguinal hernias.     Bones: No acute fracture. No suspicious osseous lesions.  Degenerative changes of the spine noting large anterior bridging osteophyte of L4-L5.     Impression:     1. Expansile mass of the right kidney measuring up to 4.1 cm, concerning for neoplastic process such as RCC.  Recommend urologic consult.  2. Submucosal edema of the stomach, possibly acute gastritis, to be correlated with clinical findings  3. Small hypodensities within the liver some of which are too small to characterize.  The largest is similar to prior noncontrast CT compatible with a cyst.  4. Additional findings discussed above.  5. This report was flagged in Epic as abnormal.    Diagnostic results:  1/14/2025: Pathology report  Final Pathologic Diagnosis 1. Kidney, right, partial nephrectomy:  - Clear cell renal cell carcinoma  - Tumor measures 4 cm in maximal dimension  - Immunostains for CK7 and CAIX support the above interpretation  - Tumor appears confined to the kidney  - Tumor is focally present at the inked parenchymal margin of resection  - Please see SYNOPTIC REPORT below      2. Soft tissue,  perirenal fat, excision:  - Benign fibroadipose tissue, negative for malignancy    SYNOPTIC REPORT  SPECIMEN, LATERALITY, PROCEDURE:  Kidney, right, partial nephrectomy  HISTOLOGIC TYPE:  Clear cell renal cell carcinoma  HISTOLOGIC GRADE (WHO/ISUP):  Nuclear grade 3  TUMOR NECROSIS:  Not identified  TUMOR SIZE:  For miss cm  TUMOR FOCALITY:  Single focus  SARCOMATOID FEATURES:  Not identified  RHABDOID FEATURES:  Not identified  TUMOR EXTENSION:  Confined to kidney  MARGINS:  Tumor is focally present at the inked parenchymal margin of resection  LYMPHOVASCULAR INVASION:  Not identified  REGIONAL LYMPH NODES:  None submitted or identified within specimen  ADDITIONAL FINDINGS:  Immunostain for CK7 and CA IX support the above interpretation (controls appropriate)  FINDINGS IN NONNEOPLASTIC KIDNEY:  Minimal chronic inflammation  PATHOLOGIC STAGE: pT1a pNX       Anticoagulation:  No    ASSESSMENT/PLAN:   We discussed AUA guidelines for surveillance of renal cell carcinoma after surgery vs. ablation.  Based on pathology, this patient has intermediate risk disease. Due to pathology report demonstrating positive margins we will follow the high-risk recommendations for surveillance. Patient expressed understanding and is in agreement.      - F/u in 6 months with repeat CT a/p and CXR done prior                              Amandeep Raymond MD   Urology PGY-1  2/3/2025

## 2025-02-03 ENCOUNTER — OFFICE VISIT (OUTPATIENT)
Dept: UROLOGY | Facility: CLINIC | Age: 60
End: 2025-02-03
Payer: COMMERCIAL

## 2025-02-03 VITALS
HEART RATE: 86 BPM | DIASTOLIC BLOOD PRESSURE: 98 MMHG | SYSTOLIC BLOOD PRESSURE: 154 MMHG | HEIGHT: 72 IN | BODY MASS INDEX: 24.81 KG/M2 | WEIGHT: 183.19 LBS

## 2025-02-03 DIAGNOSIS — N28.89 RENAL MASS: Primary | ICD-10-CM

## 2025-02-03 PROCEDURE — 3080F DIAST BP >= 90 MM HG: CPT | Mod: CPTII,S$GLB,, | Performed by: UROLOGY

## 2025-02-03 PROCEDURE — 99024 POSTOP FOLLOW-UP VISIT: CPT | Mod: S$GLB,,, | Performed by: UROLOGY

## 2025-02-03 PROCEDURE — 1159F MED LIST DOCD IN RCRD: CPT | Mod: CPTII,S$GLB,, | Performed by: UROLOGY

## 2025-02-03 PROCEDURE — 3077F SYST BP >= 140 MM HG: CPT | Mod: CPTII,S$GLB,, | Performed by: UROLOGY

## 2025-02-03 PROCEDURE — 99999 PR PBB SHADOW E&M-EST. PATIENT-LVL III: CPT | Mod: PBBFAC,,, | Performed by: UROLOGY

## 2025-02-28 RX ORDER — ROSUVASTATIN CALCIUM 10 MG/1
10 TABLET, COATED ORAL
Qty: 30 TABLET | Refills: 0 | Status: SHIPPED | OUTPATIENT
Start: 2025-02-28

## 2025-02-28 NOTE — TELEPHONE ENCOUNTER
No care due was identified.  [facility] Embedded Care Due Messages. Reference number: [id number].   2/28/2025 11:56:29 AM CST

## 2025-02-28 NOTE — TELEPHONE ENCOUNTER
Refill Routing Note   Medication(s) are not appropriate for processing by [facility] for the following reason(s):        Required labs outdated    [facility] action(s):  Defer   Requires labs : Yes - lipid panel            Appointments  past 12m or future 3m with PCP    Date Provider   Last Visit   11/22/2024 Marguerite Barrera MD   Next Visit   Visit date not found Marguerite Barrera MD   ED visits in past 90 days: 0        Note composed:12:49 PM 02/28/2025

## 2025-03-06 ENCOUNTER — PATIENT MESSAGE (OUTPATIENT)
Dept: ADMINISTRATIVE | Facility: OTHER | Age: 60
End: 2025-03-06
Payer: COMMERCIAL

## 2025-04-03 ENCOUNTER — PATIENT MESSAGE (OUTPATIENT)
Dept: INTERNAL MEDICINE | Facility: CLINIC | Age: 60
End: 2025-04-03
Payer: COMMERCIAL

## 2025-04-03 DIAGNOSIS — E78.1 HYPERTRIGLYCERIDEMIA: ICD-10-CM

## 2025-04-03 DIAGNOSIS — E78.5 HYPERLIPIDEMIA, UNSPECIFIED HYPERLIPIDEMIA TYPE: Primary | ICD-10-CM

## 2025-04-03 NOTE — TELEPHONE ENCOUNTER
Refill Routing Note   Medication(s) are not appropriate for processing by [facility] for the following reason(s):        Required labs outdated  ED/Hospital Visit since last OV with provider    [facility] action(s):  Defer        Medication Therapy Plan: Acute care/admission documentation reviewed. No change in therapy    Extended chart review required: Yes     Appointments  past 12m or future 3m with PCP    Date Provider   Last Visit   11/22/2024 Marguerite Barrera MD   Next Visit   Visit date not found Marguerite Barrera MD   ED visits in past 90 days: 0        Note composed:3:42 PM 04/03/2025

## 2025-04-03 NOTE — TELEPHONE ENCOUNTER
No care due was identified.  [facility] Embedded Care Due Messages. Reference number: [id number].   4/03/2025 3:39:12 PM CDT

## 2025-04-03 NOTE — TELEPHONE ENCOUNTER
Patient requesting refill on Rosuvastatin 10 mg  Pt's  LOV with Marguerite Barrera MD , 11/22/2024  Medication pending

## 2025-04-04 RX ORDER — ROSUVASTATIN CALCIUM 10 MG/1
10 TABLET, COATED ORAL DAILY
Qty: 90 TABLET | Refills: 0 | Status: SHIPPED | OUTPATIENT
Start: 2025-04-04

## 2025-07-01 DIAGNOSIS — E78.5 HYPERLIPIDEMIA, UNSPECIFIED HYPERLIPIDEMIA TYPE: ICD-10-CM

## 2025-07-01 DIAGNOSIS — E78.1 HYPERTRIGLYCERIDEMIA: ICD-10-CM

## 2025-07-01 NOTE — TELEPHONE ENCOUNTER
Refill Routing Note   Medication(s) are not appropriate for processing by [facility] for the following reason(s):        Required labs outdated  ED/Hospital Visit since last OV with provider    [facility] action(s):  Defer   Requires labs : Yes             Appointments  past 12m or future 3m with PCP    Date Provider   Last Visit   11/22/2024 Marguerite Barrera MD   Next Visit   Visit date not found Marguerite Barrera MD   ED visits in past 90 days: 0        Note composed:9:55 AM 07/01/2025

## 2025-07-01 NOTE — TELEPHONE ENCOUNTER
Care Due:                  Date            Visit Type   Department     Provider  --------------------------------------------------------------------------------                                EP -                              PRIMARY      [facility] INTER[facility]  Last Visit: 11-      CARE ([facility])   MEDICINE       Marguerite Barrera  Next Visit: None Scheduled  None         None Found                                                            Last  Test          Frequency    Reason                     Performed    Due Date  --------------------------------------------------------------------------------    Lipid Panel.  12 months..  rosuvastatin.............  02- 02-    [facility] Embedded Care Due Messages. Reference number: [id number].   7/01/2025 7:04:20 AM CDT

## 2025-07-02 RX ORDER — ROSUVASTATIN CALCIUM 10 MG/1
10 TABLET, COATED ORAL
Qty: 30 TABLET | Refills: 0 | Status: SHIPPED | OUTPATIENT
Start: 2025-07-02

## 2025-07-28 ENCOUNTER — HOSPITAL ENCOUNTER (OUTPATIENT)
Dept: RADIOLOGY | Facility: HOSPITAL | Age: 60
Discharge: HOME | End: 2025-07-28
Attending: UROLOGY
Payer: COMMERCIAL

## 2025-07-28 DIAGNOSIS — N28.89 RENAL MASS: ICD-10-CM

## 2025-07-28 PROCEDURE — 74177 CT ABD & PELVIS W/CONTRAST: CPT | Mod: 26,,, | Performed by: RADIOLOGY

## 2025-07-28 PROCEDURE — 25500020 PHARM REV CODE 255: Performed by: UROLOGY

## 2025-07-28 PROCEDURE — 71046 X-RAY EXAM CHEST 2 VIEWS: CPT | Mod: TC

## 2025-07-28 PROCEDURE — 71046 X-RAY EXAM CHEST 2 VIEWS: CPT | Mod: 26,,, | Performed by: RADIOLOGY

## 2025-07-28 PROCEDURE — 74177 CT ABD & PELVIS W/CONTRAST: CPT | Mod: TC

## 2025-07-28 RX ADMIN — IOHEXOL 100 ML: 350 INJECTION, SOLUTION INTRAVENOUS at 09:07

## 2025-07-29 LAB
CREAT SERPL-MCNC: 1.3 MG/DL (ref 0.5–1.4)
SAMPLE: NORMAL

## 2025-08-04 ENCOUNTER — OFFICE VISIT (OUTPATIENT)
Dept: UROLOGY | Facility: CLINIC | Age: 60
End: 2025-08-04
Payer: COMMERCIAL

## 2025-08-04 VITALS
BODY MASS INDEX: 26.46 KG/M2 | DIASTOLIC BLOOD PRESSURE: 97 MMHG | SYSTOLIC BLOOD PRESSURE: 157 MMHG | WEIGHT: 195.13 LBS | RESPIRATION RATE: 16 BRPM | HEART RATE: 88 BPM

## 2025-08-04 DIAGNOSIS — N28.89 RENAL MASS: Primary | ICD-10-CM

## 2025-08-04 PROCEDURE — 3077F SYST BP >= 140 MM HG: CPT | Mod: CPTII,S$GLB,, | Performed by: UROLOGY

## 2025-08-04 PROCEDURE — 3080F DIAST BP >= 90 MM HG: CPT | Mod: CPTII,S$GLB,, | Performed by: UROLOGY

## 2025-08-04 PROCEDURE — 3008F BODY MASS INDEX DOCD: CPT | Mod: CPTII,S$GLB,, | Performed by: UROLOGY

## 2025-08-04 PROCEDURE — 99214 OFFICE O/P EST MOD 30 MIN: CPT | Mod: S$GLB,,, | Performed by: UROLOGY

## 2025-08-04 PROCEDURE — 1159F MED LIST DOCD IN RCRD: CPT | Mod: CPTII,S$GLB,, | Performed by: UROLOGY

## 2025-08-04 PROCEDURE — 99999 PR PBB SHADOW E&M-EST. PATIENT-LVL III: CPT | Mod: PBBFAC,,, | Performed by: UROLOGY

## 2025-08-04 PROCEDURE — G2211 COMPLEX E/M VISIT ADD ON: HCPCS | Mod: S$GLB,,, | Performed by: UROLOGY

## 2025-08-04 NOTE — PROGRESS NOTES
[facility]  Urologic Oncology      Date of Service: 08/04/2025    Urologic Oncology Problem List:  Renal cell carcinoma status post right robotic partial nephrectomy on 01/14/2025  Pathology pT1a, 4 cm, grade 3 clear cell renal cell carcinoma, tumor focally present at the inked margin    History of Present Illness:   Patient is a very pleasant 59-year-old male well known to me, he is status post right robot assisted partial nephrectomy on 01/14/2025.  He had a focally positive margin however and enucleation was performed, so we are following him closely.  He should be intermediate risk and we are following has a high-risk.  He presents today with follow up labs and imaging    I have reviewed his hemoglobin which shows a hemoglobin of 13.8, BMP reveals a creatinine of 1.3    Imaging: I have reviewed the imaging study CT abdomen and pelvis and chest x-ray performed on 07/20/2025 personally, have independently interpreted this study, and agree with the findings    Allergies:  Review of patient's allergies indicates:  No Known Allergies     Medications per EMR:  Prescriptions Prior to Admission[1]    Past Medical History:  Past Medical History:   Diagnosis Date    Disorder of kidney and ureter     Iritis     x 2     Kidney stones         Past Surgical History:  Past Surgical History:   Procedure Laterality Date    COLONOSCOPY N/A 11/15/2016    Procedure: COLONOSCOPY-Miralax split prep;  Surgeon: Carlo Ward MD;  Location: [facility];  Service: Endoscopy;  Laterality: N/A;    COLONOSCOPY N/A 1/10/2023    Procedure: COLONOSCOPY;  Surgeon: Guru Barker MD;  Location: [facility] ([facility]);  Service: Endoscopy;  Laterality: N/A;  instructions sent to [facility]  pre call done    EYE SURGERY      ROBOT-ASSISTED LAPAROSCOPIC PARTIAL NEPHRECTOMY USING DA DINORAH XI Right 1/14/2025    Procedure: XI ROBOTIC NEPHRECTOMY, PARTIAL;  Surgeon: Mario Alberto Liriano MD;  [facility];   Service: Urology;  Laterality: Right;        Family History:  Family History   Problem Relation Name Age of Onset    Heart disease Mother      Colon cancer Father  70    Cancer Father      Liver cancer Father      Alcohol abuse Father      Prostate cancer Neg Hx      Heart attacks under age 50 Neg Hx          Social History:  Social History     Tobacco Use    Smoking status: Former    Smokeless tobacco: Never   Substance Use Topics    Alcohol use: Yes     Alcohol/week: 2.0 standard drinks of alcohol     Types: 2 Cans of beer per week          OBJECTIVE:     Vitals:    08/04/25 1008   BP: (!) 157/97   Patient Position: Sitting   Pulse: 88   Resp: 16   Weight: 88.5 kg (195 lb 1.7 oz)        Physical Exam    General: No acute distress. Nontoxic appearing.  HENT: Normocephalic. Atraumatic.  Respiratory: Normal respiratory effort. No conversational dyspnea. No audible wheezing.  Abdomen: No obvious distension.  Skin: No visible abnormalities.  Extremities: No edema upper extremities. No edema lower extremities.  Neurological: Alert and oriented x3. Normal speech.  Psychiatric: Normal mood. Normal affect. No evidence of SI.   -incisions clean dry and intact, no herniation      LABS:    CBC:  Lab Results   Component Value Date    WBC 8.37 07/28/2025    HGB 13.8 (L) 07/28/2025    HCT 41.0 07/28/2025    MCV 90 07/28/2025     07/28/2025         BMP:  Lab Results   Component Value Date     07/28/2025    K 4.5 07/28/2025     07/28/2025    CO2 26 07/28/2025    BUN 17 07/28/2025    CREATININE 1.3 07/28/2025    CALCIUM 9.0 07/28/2025    ANIONGAP 8 07/28/2025    EGFRNORACEVR >60 07/28/2025         ASSESSMENT/PLAN:     Assessment & Plan      Renal cell carcinoma:   -no evidence of recurrence  -reviewed CT, CBC, BMP, chest x-ray  -we will re-evaluate in 6 months with repeat CT chest abdomen and pelvis and labs    Prostate cancer screening:   -we will order a PSA when patient returns to clinic in 6 months.  His last  PSA was 07/31/2023, and was 0.44    We discussed AUA guidelines for surveillance of renal cell carcinoma after surgery vs. ablation.  Based on pathology, this patient has IR disease.                            - code applied: patient requires or will require a continuous, longitudinal, and active collaborative plan of care related to this patient's health condition, RCC --the management of which requires the direction of a practitioner with specialized clinical knowledge, skill, and expertise.     This encounter was dictated and transcribed using DeepScribe and [employer]ency[employer], please excuse any typographical or grammatical errors.           [1] (Not in a hospital admission)

## 2025-08-15 ENCOUNTER — PATIENT MESSAGE (OUTPATIENT)
Dept: INTERNAL MEDICINE | Facility: CLINIC | Age: 60
End: 2025-08-15
Payer: COMMERCIAL

## 2025-08-15 DIAGNOSIS — E78.1 HYPERTRIGLYCERIDEMIA: ICD-10-CM

## 2025-08-15 DIAGNOSIS — E78.5 HYPERLIPIDEMIA, UNSPECIFIED HYPERLIPIDEMIA TYPE: ICD-10-CM

## 2025-08-15 RX ORDER — ROSUVASTATIN CALCIUM 10 MG/1
10 TABLET, COATED ORAL DAILY
Qty: 90 TABLET | Refills: 3 | Status: SHIPPED | OUTPATIENT
Start: 2025-08-15

## (undated) DEVICE — SYR SLIP TIP 20CC

## (undated) DEVICE — DRAPE SCOPE PILLOW WARMER

## (undated) DEVICE — COVER TIP CURVED SCISSORS XI

## (undated) DEVICE — SUT ETHILON 2-0 PSLX 30IN

## (undated) DEVICE — TAPE ADH MEDIPORE 4 X 10YDS

## (undated) DEVICE — SUT PROLENE 4-0 RB-1 BL MO

## (undated) DEVICE — PORT AIRSEAL 12/120MM LPI

## (undated) DEVICE — STERILE WATER 1000ML BOTTLE

## (undated) DEVICE — DRAPE INCISE IOBAN 2 23X17IN

## (undated) DEVICE — CLIP LIGACLIP XTRA TITANIUM

## (undated) DEVICE — TRAY CATH 1-LYR URIMTR 16FR

## (undated) DEVICE — SEAL UNIVERSAL 5MM-8MM XI

## (undated) DEVICE — NDL INSUF ULTRA VERESS 120MM

## (undated) DEVICE — IRRIGATOR ENDOSCOPY DISP.

## (undated) DEVICE — SOL NACL 0.9% IV INJ 1000ML

## (undated) DEVICE — SUT 1 27IN PDS II VIO MONO

## (undated) DEVICE — SUT V-LOC 90 GS22 2-0 VIO 23CM

## (undated) DEVICE — ADHESIVE DERMABOND ADVANCED

## (undated) DEVICE — TAPE CURAD ELAS FOAM 3INX5.5YD

## (undated) DEVICE — DRAPE COLUMN DAVINCI XI

## (undated) DEVICE — SYR 30CC LUER LOCK

## (undated) DEVICE — SET TRI-LUMEN FILTERED TUBE

## (undated) DEVICE — ELECTRODE REM PLYHSV RETURN 9

## (undated) DEVICE — SUT MCRYL PLUS 4-0 PS2 27IN

## (undated) DEVICE — TUBING NEPTUNE 2 SMOKE 10IN

## (undated) DEVICE — SUT VICRYL 2-0 36 CT-1

## (undated) DEVICE — GAUZE DRAIN N WVN 6PLY 4X4IN

## (undated) DEVICE — ELECTRODE EXTENDED BLADE

## (undated) DEVICE — DRAPE ARM DAVINCI XI

## (undated) DEVICE — SOL ELECTROLUBE ANTI-STIC

## (undated) DEVICE — CLIP HEMO-LOK MLX LARGE LF

## (undated) DEVICE — DRESSING ABSRBNT ISLAND 3.6X8

## (undated) DEVICE — BLADE SURG #15 CARBON STEEL

## (undated) DEVICE — DRAIN CHANNEL ROUND 15FR

## (undated) DEVICE — KIT SURGIFLO HEMOSTATIC MATRIX

## (undated) DEVICE — EVACUATOR WOUND BULB 100CC

## (undated) DEVICE — GOWN SURGICAL X-LARGE

## (undated) DEVICE — DRAPE ABDOMINAL TIBURON 14X11

## (undated) DEVICE — TRAY MINOR GEN SURG OMC